# Patient Record
Sex: MALE | Race: WHITE | NOT HISPANIC OR LATINO
[De-identification: names, ages, dates, MRNs, and addresses within clinical notes are randomized per-mention and may not be internally consistent; named-entity substitution may affect disease eponyms.]

---

## 2017-01-13 ENCOUNTER — RECORDS - HEALTHEAST (OUTPATIENT)
Dept: ADMINISTRATIVE | Facility: OTHER | Age: 82
End: 2017-01-13

## 2017-03-24 ENCOUNTER — OFFICE VISIT - HEALTHEAST (OUTPATIENT)
Dept: INTERNAL MEDICINE | Facility: CLINIC | Age: 82
End: 2017-03-24

## 2017-03-24 ENCOUNTER — COMMUNICATION - HEALTHEAST (OUTPATIENT)
Dept: INTERNAL MEDICINE | Facility: CLINIC | Age: 82
End: 2017-03-24

## 2017-03-24 DIAGNOSIS — I10 ESSENTIAL HYPERTENSION: ICD-10-CM

## 2017-03-24 DIAGNOSIS — I25.9 IHD (ISCHEMIC HEART DISEASE): ICD-10-CM

## 2017-03-24 DIAGNOSIS — N42.9 DISORDER OF PROSTATE: ICD-10-CM

## 2017-03-24 ASSESSMENT — MIFFLIN-ST. JEOR: SCORE: 1272.78

## 2017-04-11 ENCOUNTER — RECORDS - HEALTHEAST (OUTPATIENT)
Dept: ADMINISTRATIVE | Facility: OTHER | Age: 82
End: 2017-04-11

## 2017-06-30 ENCOUNTER — OFFICE VISIT - HEALTHEAST (OUTPATIENT)
Dept: INTERNAL MEDICINE | Facility: CLINIC | Age: 82
End: 2017-06-30

## 2017-06-30 DIAGNOSIS — E78.5 HYPERLIPEMIA: ICD-10-CM

## 2017-06-30 DIAGNOSIS — D49.0 RECTAL TUMOR: ICD-10-CM

## 2017-06-30 DIAGNOSIS — I10 ESSENTIAL HYPERTENSION: ICD-10-CM

## 2017-06-30 DIAGNOSIS — I25.9 IHD (ISCHEMIC HEART DISEASE): ICD-10-CM

## 2017-06-30 LAB
CHOLEST SERPL-MCNC: 128 MG/DL
FASTING STATUS PATIENT QL REPORTED: YES
HDLC SERPL-MCNC: 45 MG/DL
LDLC SERPL CALC-MCNC: 67 MG/DL
TRIGL SERPL-MCNC: 79 MG/DL

## 2017-06-30 ASSESSMENT — MIFFLIN-ST. JEOR: SCORE: 1263.71

## 2017-07-11 ENCOUNTER — COMMUNICATION - HEALTHEAST (OUTPATIENT)
Dept: INTERNAL MEDICINE | Facility: CLINIC | Age: 82
End: 2017-07-11

## 2017-08-08 ENCOUNTER — RECORDS - HEALTHEAST (OUTPATIENT)
Dept: ADMINISTRATIVE | Facility: OTHER | Age: 82
End: 2017-08-08

## 2017-10-06 ENCOUNTER — OFFICE VISIT - HEALTHEAST (OUTPATIENT)
Dept: INTERNAL MEDICINE | Facility: CLINIC | Age: 82
End: 2017-10-06

## 2017-10-06 DIAGNOSIS — N42.9 DISORDER OF PROSTATE: ICD-10-CM

## 2017-10-06 DIAGNOSIS — I10 ESSENTIAL HYPERTENSION: ICD-10-CM

## 2017-10-06 DIAGNOSIS — E78.5 HYPERLIPEMIA: ICD-10-CM

## 2017-10-06 DIAGNOSIS — I25.9 IHD (ISCHEMIC HEART DISEASE): ICD-10-CM

## 2017-10-06 DIAGNOSIS — Z23 INFLUENZA VACCINE ADMINISTERED: ICD-10-CM

## 2017-10-06 LAB
CHOLEST SERPL-MCNC: 136 MG/DL
FASTING STATUS PATIENT QL REPORTED: YES
HDLC SERPL-MCNC: 47 MG/DL
LDLC SERPL CALC-MCNC: 72 MG/DL
TRIGL SERPL-MCNC: 86 MG/DL

## 2017-10-06 ASSESSMENT — MIFFLIN-ST. JEOR: SCORE: 1259.72

## 2018-01-12 ENCOUNTER — OFFICE VISIT - HEALTHEAST (OUTPATIENT)
Dept: INTERNAL MEDICINE | Facility: CLINIC | Age: 83
End: 2018-01-12

## 2018-01-12 ENCOUNTER — COMMUNICATION - HEALTHEAST (OUTPATIENT)
Dept: INTERNAL MEDICINE | Facility: CLINIC | Age: 83
End: 2018-01-12

## 2018-01-12 DIAGNOSIS — N42.31 PIN (PROSTATIC INTRAEPITHELIAL NEOPLASIA): ICD-10-CM

## 2018-01-12 DIAGNOSIS — E78.5 HYPERLIPEMIA: ICD-10-CM

## 2018-01-12 DIAGNOSIS — I10 ESSENTIAL HYPERTENSION: ICD-10-CM

## 2018-01-12 DIAGNOSIS — I25.9 IHD (ISCHEMIC HEART DISEASE): ICD-10-CM

## 2018-01-12 LAB
ALBUMIN SERPL-MCNC: 3.9 G/DL (ref 3.5–5)
ALP SERPL-CCNC: 70 U/L (ref 45–120)
ALT SERPL W P-5'-P-CCNC: 24 U/L (ref 0–45)
ANION GAP SERPL CALCULATED.3IONS-SCNC: 10 MMOL/L (ref 5–18)
AST SERPL W P-5'-P-CCNC: 25 U/L (ref 0–40)
BASOPHILS # BLD AUTO: 0.1 THOU/UL (ref 0–0.2)
BASOPHILS NFR BLD AUTO: 1 % (ref 0–2)
BILIRUB DIRECT SERPL-MCNC: 0.2 MG/DL
BILIRUB SERPL-MCNC: 0.8 MG/DL (ref 0–1)
BUN SERPL-MCNC: 26 MG/DL (ref 8–28)
CALCIUM SERPL-MCNC: 9.7 MG/DL (ref 8.5–10.5)
CHLORIDE BLD-SCNC: 105 MMOL/L (ref 98–107)
CHOLEST SERPL-MCNC: 138 MG/DL
CO2 SERPL-SCNC: 26 MMOL/L (ref 22–31)
CREAT SERPL-MCNC: 1.08 MG/DL (ref 0.7–1.3)
EOSINOPHIL # BLD AUTO: 0.3 THOU/UL (ref 0–0.4)
EOSINOPHIL NFR BLD AUTO: 4 % (ref 0–6)
ERYTHROCYTE [DISTWIDTH] IN BLOOD BY AUTOMATED COUNT: 12.1 % (ref 11–14.5)
FASTING STATUS PATIENT QL REPORTED: YES
GFR SERPL CREATININE-BSD FRML MDRD: >60 ML/MIN/1.73M2
GLUCOSE BLD-MCNC: 103 MG/DL (ref 70–125)
HCT VFR BLD AUTO: 42.2 % (ref 40–54)
HDLC SERPL-MCNC: 47 MG/DL
HGB BLD-MCNC: 14.1 G/DL (ref 14–18)
LDLC SERPL CALC-MCNC: 73 MG/DL
LYMPHOCYTES # BLD AUTO: 1.6 THOU/UL (ref 0.8–4.4)
LYMPHOCYTES NFR BLD AUTO: 24 % (ref 20–40)
MCH RBC QN AUTO: 30.6 PG (ref 27–34)
MCHC RBC AUTO-ENTMCNC: 33.3 G/DL (ref 32–36)
MCV RBC AUTO: 92 FL (ref 80–100)
MONOCYTES # BLD AUTO: 0.8 THOU/UL (ref 0–0.9)
MONOCYTES NFR BLD AUTO: 11 % (ref 2–10)
NEUTROPHILS # BLD AUTO: 3.9 THOU/UL (ref 2–7.7)
NEUTROPHILS NFR BLD AUTO: 60 % (ref 50–70)
PLATELET # BLD AUTO: 137 THOU/UL (ref 140–440)
PMV BLD AUTO: 9 FL (ref 7–10)
POTASSIUM BLD-SCNC: 4 MMOL/L (ref 3.5–5)
PROT SERPL-MCNC: 7.4 G/DL (ref 6–8)
RBC # BLD AUTO: 4.6 MILL/UL (ref 4.4–6.2)
SODIUM SERPL-SCNC: 141 MMOL/L (ref 136–145)
TRIGL SERPL-MCNC: 88 MG/DL
WBC: 6.6 THOU/UL (ref 4–11)

## 2018-01-12 ASSESSMENT — MIFFLIN-ST. JEOR: SCORE: 1277.5

## 2018-02-06 ENCOUNTER — RECORDS - HEALTHEAST (OUTPATIENT)
Dept: ADMINISTRATIVE | Facility: OTHER | Age: 83
End: 2018-02-06

## 2018-04-13 ENCOUNTER — COMMUNICATION - HEALTHEAST (OUTPATIENT)
Dept: INTERNAL MEDICINE | Facility: CLINIC | Age: 83
End: 2018-04-13

## 2018-04-13 ENCOUNTER — OFFICE VISIT - HEALTHEAST (OUTPATIENT)
Dept: INTERNAL MEDICINE | Facility: CLINIC | Age: 83
End: 2018-04-13

## 2018-04-13 DIAGNOSIS — I10 ESSENTIAL HYPERTENSION: ICD-10-CM

## 2018-04-13 DIAGNOSIS — D69.6 THROMBOCYTOPENIA (H): ICD-10-CM

## 2018-04-13 DIAGNOSIS — I25.9 IHD (ISCHEMIC HEART DISEASE): ICD-10-CM

## 2018-04-13 DIAGNOSIS — N42.31 PIN (PROSTATIC INTRAEPITHELIAL NEOPLASIA): ICD-10-CM

## 2018-04-13 DIAGNOSIS — E78.5 HYPERLIPEMIA: ICD-10-CM

## 2018-04-13 LAB
ALBUMIN SERPL-MCNC: 3.9 G/DL (ref 3.5–5)
ALP SERPL-CCNC: 74 U/L (ref 45–120)
ALT SERPL W P-5'-P-CCNC: 30 U/L (ref 0–45)
ANION GAP SERPL CALCULATED.3IONS-SCNC: 11 MMOL/L (ref 5–18)
AST SERPL W P-5'-P-CCNC: 24 U/L (ref 0–40)
BASOPHILS # BLD AUTO: 0.1 THOU/UL (ref 0–0.2)
BASOPHILS NFR BLD AUTO: 1 % (ref 0–2)
BILIRUB DIRECT SERPL-MCNC: 0.3 MG/DL
BILIRUB SERPL-MCNC: 0.8 MG/DL (ref 0–1)
BUN SERPL-MCNC: 34 MG/DL (ref 8–28)
CALCIUM SERPL-MCNC: 9.9 MG/DL (ref 8.5–10.5)
CHLORIDE BLD-SCNC: 104 MMOL/L (ref 98–107)
CHOLEST SERPL-MCNC: 131 MG/DL
CO2 SERPL-SCNC: 23 MMOL/L (ref 22–31)
CREAT SERPL-MCNC: 1.35 MG/DL (ref 0.7–1.3)
EOSINOPHIL # BLD AUTO: 0.2 THOU/UL (ref 0–0.4)
EOSINOPHIL NFR BLD AUTO: 3 % (ref 0–6)
ERYTHROCYTE [DISTWIDTH] IN BLOOD BY AUTOMATED COUNT: 12.5 % (ref 11–14.5)
FASTING STATUS PATIENT QL REPORTED: YES
GFR SERPL CREATININE-BSD FRML MDRD: 50 ML/MIN/1.73M2
GLUCOSE BLD-MCNC: 101 MG/DL (ref 70–125)
HCT VFR BLD AUTO: 44.7 % (ref 40–54)
HDLC SERPL-MCNC: 45 MG/DL
HGB BLD-MCNC: 14.5 G/DL (ref 14–18)
LDLC SERPL CALC-MCNC: 72 MG/DL
LYMPHOCYTES # BLD AUTO: 1.7 THOU/UL (ref 0.8–4.4)
LYMPHOCYTES NFR BLD AUTO: 22 % (ref 20–40)
MCH RBC QN AUTO: 30.8 PG (ref 27–34)
MCHC RBC AUTO-ENTMCNC: 32.4 G/DL (ref 32–36)
MCV RBC AUTO: 95 FL (ref 80–100)
MONOCYTES # BLD AUTO: 0.9 THOU/UL (ref 0–0.9)
MONOCYTES NFR BLD AUTO: 13 % (ref 2–10)
NEUTROPHILS # BLD AUTO: 4.5 THOU/UL (ref 2–7.7)
NEUTROPHILS NFR BLD AUTO: 62 % (ref 50–70)
PLATELET # BLD AUTO: 154 THOU/UL (ref 140–440)
PMV BLD AUTO: 12.3 FL (ref 8.5–12.5)
POTASSIUM BLD-SCNC: 4.2 MMOL/L (ref 3.5–5)
PROT SERPL-MCNC: 7.5 G/DL (ref 6–8)
RBC # BLD AUTO: 4.71 MILL/UL (ref 4.4–6.2)
SODIUM SERPL-SCNC: 138 MMOL/L (ref 136–145)
TRIGL SERPL-MCNC: 69 MG/DL
WBC: 7.4 THOU/UL (ref 4–11)

## 2018-04-13 ASSESSMENT — MIFFLIN-ST. JEOR: SCORE: 1272.96

## 2018-07-20 ENCOUNTER — OFFICE VISIT - HEALTHEAST (OUTPATIENT)
Dept: INTERNAL MEDICINE | Facility: CLINIC | Age: 83
End: 2018-07-20

## 2018-07-20 DIAGNOSIS — D69.6 THROMBOCYTOPENIA (H): ICD-10-CM

## 2018-07-20 DIAGNOSIS — E78.5 HYPERLIPEMIA: ICD-10-CM

## 2018-07-20 DIAGNOSIS — I10 ESSENTIAL HYPERTENSION: ICD-10-CM

## 2018-07-20 DIAGNOSIS — I25.9 IHD (ISCHEMIC HEART DISEASE): ICD-10-CM

## 2018-07-20 LAB
ALBUMIN SERPL-MCNC: 3.9 G/DL (ref 3.5–5)
ALP SERPL-CCNC: 59 U/L (ref 45–120)
ALT SERPL W P-5'-P-CCNC: 22 U/L (ref 0–45)
ANION GAP SERPL CALCULATED.3IONS-SCNC: 10 MMOL/L (ref 5–18)
AST SERPL W P-5'-P-CCNC: 23 U/L (ref 0–40)
BASOPHILS # BLD AUTO: 0 THOU/UL (ref 0–0.2)
BASOPHILS NFR BLD AUTO: 1 % (ref 0–2)
BILIRUB DIRECT SERPL-MCNC: 0.2 MG/DL
BILIRUB SERPL-MCNC: 0.7 MG/DL (ref 0–1)
BUN SERPL-MCNC: 24 MG/DL (ref 8–28)
CALCIUM SERPL-MCNC: 9.7 MG/DL (ref 8.5–10.5)
CHLORIDE BLD-SCNC: 108 MMOL/L (ref 98–107)
CHOLEST SERPL-MCNC: 123 MG/DL
CO2 SERPL-SCNC: 23 MMOL/L (ref 22–31)
CREAT SERPL-MCNC: 1.12 MG/DL (ref 0.7–1.3)
EOSINOPHIL # BLD AUTO: 0.2 THOU/UL (ref 0–0.4)
EOSINOPHIL NFR BLD AUTO: 3 % (ref 0–6)
ERYTHROCYTE [DISTWIDTH] IN BLOOD BY AUTOMATED COUNT: 13.2 % (ref 11–14.5)
FASTING STATUS PATIENT QL REPORTED: YES
GFR SERPL CREATININE-BSD FRML MDRD: >60 ML/MIN/1.73M2
GLUCOSE BLD-MCNC: 105 MG/DL (ref 70–125)
HCT VFR BLD AUTO: 43 % (ref 40–54)
HDLC SERPL-MCNC: 46 MG/DL
HGB BLD-MCNC: 14 G/DL (ref 14–18)
LDLC SERPL CALC-MCNC: 63 MG/DL
LYMPHOCYTES # BLD AUTO: 1.4 THOU/UL (ref 0.8–4.4)
LYMPHOCYTES NFR BLD AUTO: 20 % (ref 20–40)
MCH RBC QN AUTO: 30.4 PG (ref 27–34)
MCHC RBC AUTO-ENTMCNC: 32.6 G/DL (ref 32–36)
MCV RBC AUTO: 94 FL (ref 80–100)
MONOCYTES # BLD AUTO: 0.8 THOU/UL (ref 0–0.9)
MONOCYTES NFR BLD AUTO: 12 % (ref 2–10)
NEUTROPHILS # BLD AUTO: 4.4 THOU/UL (ref 2–7.7)
NEUTROPHILS NFR BLD AUTO: 65 % (ref 50–70)
PLATELET # BLD AUTO: 146 THOU/UL (ref 140–440)
PMV BLD AUTO: 12.5 FL (ref 8.5–12.5)
POTASSIUM BLD-SCNC: 4.2 MMOL/L (ref 3.5–5)
PROT SERPL-MCNC: 6.8 G/DL (ref 6–8)
RBC # BLD AUTO: 4.6 MILL/UL (ref 4.4–6.2)
SODIUM SERPL-SCNC: 141 MMOL/L (ref 136–145)
TRIGL SERPL-MCNC: 69 MG/DL
WBC: 6.8 THOU/UL (ref 4–11)

## 2018-07-20 ASSESSMENT — MIFFLIN-ST. JEOR: SCORE: 1255.18

## 2018-10-26 ENCOUNTER — RECORDS - HEALTHEAST (OUTPATIENT)
Dept: GENERAL RADIOLOGY | Facility: CLINIC | Age: 83
End: 2018-10-26

## 2018-10-26 ENCOUNTER — COMMUNICATION - HEALTHEAST (OUTPATIENT)
Dept: INTERNAL MEDICINE | Facility: CLINIC | Age: 83
End: 2018-10-26

## 2018-10-26 ENCOUNTER — OFFICE VISIT - HEALTHEAST (OUTPATIENT)
Dept: INTERNAL MEDICINE | Facility: CLINIC | Age: 83
End: 2018-10-26

## 2018-10-26 DIAGNOSIS — I10 ESSENTIAL HYPERTENSION: ICD-10-CM

## 2018-10-26 DIAGNOSIS — D49.0 RECTAL TUMOR: ICD-10-CM

## 2018-10-26 DIAGNOSIS — Z23 FLU VACCINE NEED: ICD-10-CM

## 2018-10-26 DIAGNOSIS — R91.8 ABNORMALITY OF LUNG ON CXR: ICD-10-CM

## 2018-10-26 DIAGNOSIS — R06.00 DYSPNEA: ICD-10-CM

## 2018-10-26 DIAGNOSIS — N42.31 PIN (PROSTATIC INTRAEPITHELIAL NEOPLASIA): ICD-10-CM

## 2018-10-26 DIAGNOSIS — E78.5 HYPERLIPEMIA: ICD-10-CM

## 2018-10-26 DIAGNOSIS — R06.00 DYSPNEA, UNSPECIFIED: ICD-10-CM

## 2018-10-26 DIAGNOSIS — I25.9 IHD (ISCHEMIC HEART DISEASE): ICD-10-CM

## 2018-10-26 LAB
ALBUMIN SERPL-MCNC: 3.9 G/DL (ref 3.5–5)
ALP SERPL-CCNC: 75 U/L (ref 45–120)
ALT SERPL W P-5'-P-CCNC: 22 U/L (ref 0–45)
ANION GAP SERPL CALCULATED.3IONS-SCNC: 13 MMOL/L (ref 5–18)
AST SERPL W P-5'-P-CCNC: 22 U/L (ref 0–40)
ATRIAL RATE - MUSE: 82 BPM
BASOPHILS # BLD AUTO: 0.1 THOU/UL (ref 0–0.2)
BASOPHILS NFR BLD AUTO: 1 % (ref 0–2)
BILIRUB DIRECT SERPL-MCNC: 0.3 MG/DL
BILIRUB SERPL-MCNC: 0.7 MG/DL (ref 0–1)
BNP SERPL-MCNC: 29 PG/ML (ref 0–93)
BUN SERPL-MCNC: 31 MG/DL (ref 8–28)
CALCIUM SERPL-MCNC: 10.1 MG/DL (ref 8.5–10.5)
CHLORIDE BLD-SCNC: 106 MMOL/L (ref 98–107)
CHOLEST SERPL-MCNC: 128 MG/DL
CO2 SERPL-SCNC: 21 MMOL/L (ref 22–31)
CREAT SERPL-MCNC: 1.24 MG/DL (ref 0.7–1.3)
DIASTOLIC BLOOD PRESSURE - MUSE: NORMAL MMHG
EOSINOPHIL # BLD AUTO: 0.2 THOU/UL (ref 0–0.4)
EOSINOPHIL NFR BLD AUTO: 3 % (ref 0–6)
ERYTHROCYTE [DISTWIDTH] IN BLOOD BY AUTOMATED COUNT: 13 % (ref 11–14.5)
ERYTHROCYTE [SEDIMENTATION RATE] IN BLOOD BY WESTERGREN METHOD: 23 MM/HR (ref 0–15)
FASTING STATUS PATIENT QL REPORTED: YES
GFR SERPL CREATININE-BSD FRML MDRD: 55 ML/MIN/1.73M2
GLUCOSE BLD-MCNC: 105 MG/DL (ref 70–125)
HCT VFR BLD AUTO: 43.4 % (ref 40–54)
HDLC SERPL-MCNC: 51 MG/DL
HGB BLD-MCNC: 14.3 G/DL (ref 14–18)
INTERPRETATION ECG - MUSE: NORMAL
LDLC SERPL CALC-MCNC: 65 MG/DL
LYMPHOCYTES # BLD AUTO: 1.1 THOU/UL (ref 0.8–4.4)
LYMPHOCYTES NFR BLD AUTO: 16 % (ref 20–40)
MCH RBC QN AUTO: 30.6 PG (ref 27–34)
MCHC RBC AUTO-ENTMCNC: 32.9 G/DL (ref 32–36)
MCV RBC AUTO: 93 FL (ref 80–100)
MONOCYTES # BLD AUTO: 0.8 THOU/UL (ref 0–0.9)
MONOCYTES NFR BLD AUTO: 12 % (ref 2–10)
NEUTROPHILS # BLD AUTO: 4.6 THOU/UL (ref 2–7.7)
NEUTROPHILS NFR BLD AUTO: 69 % (ref 50–70)
P AXIS - MUSE: 69 DEGREES
PLATELET # BLD AUTO: 163 THOU/UL (ref 140–440)
PMV BLD AUTO: 12.1 FL (ref 8.5–12.5)
POTASSIUM BLD-SCNC: 4.5 MMOL/L (ref 3.5–5)
PR INTERVAL - MUSE: 176 MS
PROT SERPL-MCNC: 7.2 G/DL (ref 6–8)
QRS DURATION - MUSE: 102 MS
QT - MUSE: 388 MS
QTC - MUSE: 453 MS
R AXIS - MUSE: 74 DEGREES
RBC # BLD AUTO: 4.68 MILL/UL (ref 4.4–6.2)
SODIUM SERPL-SCNC: 140 MMOL/L (ref 136–145)
SYSTOLIC BLOOD PRESSURE - MUSE: NORMAL MMHG
T AXIS - MUSE: 53 DEGREES
TRIGL SERPL-MCNC: 59 MG/DL
TSH SERPL DL<=0.005 MIU/L-ACNC: 2.5 UIU/ML (ref 0.3–5)
VENTRICULAR RATE- MUSE: 82 BPM
WBC: 6.7 THOU/UL (ref 4–11)

## 2018-10-26 ASSESSMENT — MIFFLIN-ST. JEOR: SCORE: 1213.82

## 2018-10-31 ENCOUNTER — RECORDS - HEALTHEAST (OUTPATIENT)
Dept: ADMINISTRATIVE | Facility: OTHER | Age: 83
End: 2018-10-31

## 2018-11-05 ENCOUNTER — HOSPITAL ENCOUNTER (OUTPATIENT)
Dept: CARDIOLOGY | Facility: HOSPITAL | Age: 83
Discharge: HOME | End: 2018-11-05
Attending: INTERNAL MEDICINE

## 2018-11-05 ENCOUNTER — HOSPITAL ENCOUNTER (OUTPATIENT)
Dept: CT IMAGING | Facility: HOSPITAL | Age: 83
Discharge: HOME | End: 2018-11-05
Attending: INTERNAL MEDICINE

## 2018-11-05 DIAGNOSIS — R06.00 DYSPNEA: ICD-10-CM

## 2018-11-05 DIAGNOSIS — R91.8 ABNORMALITY OF LUNG ON CXR: ICD-10-CM

## 2018-11-05 DIAGNOSIS — I25.9 IHD (ISCHEMIC HEART DISEASE): ICD-10-CM

## 2018-11-05 LAB
CREAT BLD-MCNC: 1.3 MG/DL
POC GFR AMER AF HE - HISTORICAL: >60 ML/MIN/1.73M2
POC GFR NON AMER AF HE - HISTORICAL: 51 ML/MIN/1.73M2

## 2018-11-05 ASSESSMENT — MIFFLIN-ST. JEOR: SCORE: 1213.82

## 2018-11-06 LAB
AORTIC ROOT: 3.1 CM
AORTIC VALVE MEAN VELOCITY: 196 CM/S
AR DECEL SLOPE: 2140 MM/S2
AR PEAK VELOCITY: 329 CM/S
AV DIMENSIONLESS INDEX VTI: 0.4
AV MEAN GRADIENT: 18 MMHG
AV PEAK GRADIENT: 35.3 MMHG
AV REGURGITANT PEAK GRADIENT: 43.3 MMHG
AV REGURGITATION PRESSURE HALF TIME: 458 MS
AV VALVE AREA: 1.1 CM2
AV VELOCITY RATIO: 0.4
BSA FOR ECHO PROCEDURE: 1.71 M2
CV BLOOD PRESSURE: NORMAL MMHG
CV ECHO HEIGHT: 65.8 IN
CV ECHO WEIGHT: 140 LBS
DOP CALC AO PEAK VEL: 297 CM/S
DOP CALC AO VTI: 52.4 CM
DOP CALC LVOT AREA: 2.54 CM2
DOP CALC LVOT DIAMETER: 1.8 CM
DOP CALC LVOT PEAK VEL: 112 CM/S
DOP CALC LVOT STROKE VOLUME: 58.2 CM3
DOP CALCLVOT PEAK VEL VTI: 22.9 CM
ECHO EJECTION FRACTION ESTIMATED: 60 %
EJECTION FRACTION: 53 % (ref 55–75)
FRACTIONAL SHORTENING: 19.6 % (ref 28–44)
INTERVENTRICULAR SEPTUM IN END DIASTOLE: 1.51 CM (ref 0.6–1)
IVS/PW RATIO: 1.2
LA AREA 1: 14 CM2
LA AREA 2: 16 CM2
LEFT ATRIUM LENGTH: 3.9 CM
LEFT ATRIUM SIZE: 3.5 CM
LEFT ATRIUM TO AORTIC ROOT RATIO: 1.13 NO UNITS
LEFT ATRIUM VOLUME INDEX: 28.6 ML/M2
LEFT ATRIUM VOLUME: 48.8 ML
LEFT VENTRICLE CARDIAC INDEX: 2.5 L/MIN/M2
LEFT VENTRICLE CARDIAC OUTPUT: 4.3 L/MIN
LEFT VENTRICLE DIASTOLIC VOLUME INDEX: 60.2 CM3/M2 (ref 34–74)
LEFT VENTRICLE DIASTOLIC VOLUME: 103 CM3 (ref 62–150)
LEFT VENTRICLE HEART RATE: 73 BPM
LEFT VENTRICLE MASS INDEX: 101.4 G/M2
LEFT VENTRICLE SYSTOLIC VOLUME INDEX: 28.1 CM3/M2 (ref 11–31)
LEFT VENTRICLE SYSTOLIC VOLUME: 48.1 CM3 (ref 21–61)
LEFT VENTRICULAR INTERNAL DIMENSION IN DIASTOLE: 3.52 CM (ref 4.2–5.8)
LEFT VENTRICULAR INTERNAL DIMENSION IN SYSTOLE: 2.83 CM (ref 2.5–4)
LEFT VENTRICULAR MASS: 173.3 G
LEFT VENTRICULAR OUTFLOW TRACT MEAN GRADIENT: 3 MMHG
LEFT VENTRICULAR OUTFLOW TRACT MEAN VELOCITY: 75.1 CM/S
LEFT VENTRICULAR OUTFLOW TRACT PEAK GRADIENT: 5 MMHG
LEFT VENTRICULAR POSTERIOR WALL IN END DIASTOLE: 1.28 CM (ref 0.6–1)
LV STROKE VOLUME INDEX: 34.1 ML/M2
NUC REST DIASTOLIC VOLUME INDEX: 2240 LBS
NUC REST SYSTOLIC VOLUME INDEX: 65.75 IN
PR MAX PG: 5 MMHG
PR PEAK VELOCITY: 107 CM/S
TRICUSPID REGURGITATION PEAK PRESSURE GRADIENT: 29.8 MMHG
TRICUSPID VALVE ANULAR PLANE SYSTOLIC EXCURSION: 2.9 CM
TRICUSPID VALVE PEAK REGURGITANT VELOCITY: 273 CM/S

## 2018-11-07 ENCOUNTER — COMMUNICATION - HEALTHEAST (OUTPATIENT)
Dept: INTERNAL MEDICINE | Facility: CLINIC | Age: 83
End: 2018-11-07

## 2018-11-08 ENCOUNTER — COMMUNICATION - HEALTHEAST (OUTPATIENT)
Dept: INTERNAL MEDICINE | Facility: CLINIC | Age: 83
End: 2018-11-08

## 2018-12-07 ENCOUNTER — OFFICE VISIT - HEALTHEAST (OUTPATIENT)
Dept: INTERNAL MEDICINE | Facility: CLINIC | Age: 83
End: 2018-12-07

## 2018-12-07 ENCOUNTER — COMMUNICATION - HEALTHEAST (OUTPATIENT)
Dept: INTERNAL MEDICINE | Facility: CLINIC | Age: 83
End: 2018-12-07

## 2018-12-07 DIAGNOSIS — D12.8 ADENOMATOUS RECTAL POLYP: ICD-10-CM

## 2018-12-07 DIAGNOSIS — E78.5 HYPERLIPIDEMIA, UNSPECIFIED HYPERLIPIDEMIA TYPE: ICD-10-CM

## 2018-12-07 DIAGNOSIS — I10 ESSENTIAL HYPERTENSION: ICD-10-CM

## 2018-12-07 DIAGNOSIS — I25.9 IHD (ISCHEMIC HEART DISEASE): ICD-10-CM

## 2018-12-07 DIAGNOSIS — D69.6 THROMBOCYTOPENIA (H): ICD-10-CM

## 2018-12-07 DIAGNOSIS — I35.0 MODERATE AORTIC STENOSIS BY PRIOR ECHOCARDIOGRAPHY: ICD-10-CM

## 2018-12-07 LAB
ALBUMIN SERPL-MCNC: 3.9 G/DL (ref 3.5–5)
ANION GAP SERPL CALCULATED.3IONS-SCNC: 10 MMOL/L (ref 5–18)
BASOPHILS # BLD AUTO: 0 THOU/UL (ref 0–0.2)
BASOPHILS NFR BLD AUTO: 1 % (ref 0–2)
BUN SERPL-MCNC: 30 MG/DL (ref 8–28)
CALCIUM SERPL-MCNC: 10 MG/DL (ref 8.5–10.5)
CHLORIDE BLD-SCNC: 106 MMOL/L (ref 98–107)
CHOLEST SERPL-MCNC: 124 MG/DL
CO2 SERPL-SCNC: 25 MMOL/L (ref 22–31)
CREAT SERPL-MCNC: 1.01 MG/DL (ref 0.7–1.3)
EOSINOPHIL # BLD AUTO: 0.2 THOU/UL (ref 0–0.4)
EOSINOPHIL NFR BLD AUTO: 3 % (ref 0–6)
ERYTHROCYTE [DISTWIDTH] IN BLOOD BY AUTOMATED COUNT: 13 % (ref 11–14.5)
FASTING STATUS PATIENT QL REPORTED: YES
GFR SERPL CREATININE-BSD FRML MDRD: >60 ML/MIN/1.73M2
GLUCOSE BLD-MCNC: 105 MG/DL (ref 70–125)
HCT VFR BLD AUTO: 41.3 % (ref 40–54)
HDLC SERPL-MCNC: 50 MG/DL
HGB BLD-MCNC: 13.9 G/DL (ref 14–18)
LDLC SERPL CALC-MCNC: 59 MG/DL
LYMPHOCYTES # BLD AUTO: 1.5 THOU/UL (ref 0.8–4.4)
LYMPHOCYTES NFR BLD AUTO: 24 % (ref 20–40)
MCH RBC QN AUTO: 31.1 PG (ref 27–34)
MCHC RBC AUTO-ENTMCNC: 33.7 G/DL (ref 32–36)
MCV RBC AUTO: 92 FL (ref 80–100)
MONOCYTES # BLD AUTO: 0.6 THOU/UL (ref 0–0.9)
MONOCYTES NFR BLD AUTO: 10 % (ref 2–10)
NEUTROPHILS # BLD AUTO: 3.7 THOU/UL (ref 2–7.7)
NEUTROPHILS NFR BLD AUTO: 62 % (ref 50–70)
PHOSPHATE SERPL-MCNC: 3.8 MG/DL (ref 2.5–4.5)
PLATELET # BLD AUTO: 138 THOU/UL (ref 140–440)
PMV BLD AUTO: 8.8 FL (ref 7–10)
POTASSIUM BLD-SCNC: 4.1 MMOL/L (ref 3.5–5)
RBC # BLD AUTO: 4.49 MILL/UL (ref 4.4–6.2)
SODIUM SERPL-SCNC: 141 MMOL/L (ref 136–145)
TRIGL SERPL-MCNC: 76 MG/DL
WBC: 6 THOU/UL (ref 4–11)

## 2018-12-07 ASSESSMENT — MIFFLIN-ST. JEOR: SCORE: 1223.43

## 2018-12-12 ASSESSMENT — MIFFLIN-ST. JEOR: SCORE: 1222.32

## 2018-12-13 ENCOUNTER — ANESTHESIA - HEALTHEAST (OUTPATIENT)
Dept: SURGERY | Facility: AMBULATORY SURGERY CENTER | Age: 83
End: 2018-12-13

## 2019-01-03 ENCOUNTER — SURGERY - HEALTHEAST (OUTPATIENT)
Dept: SURGERY | Facility: AMBULATORY SURGERY CENTER | Age: 84
End: 2019-01-03

## 2019-01-03 ASSESSMENT — MIFFLIN-ST. JEOR: SCORE: 1222.32

## 2019-01-29 ENCOUNTER — RECORDS - HEALTHEAST (OUTPATIENT)
Dept: ADMINISTRATIVE | Facility: OTHER | Age: 84
End: 2019-01-29

## 2019-02-21 ENCOUNTER — COMMUNICATION - HEALTHEAST (OUTPATIENT)
Dept: INTERNAL MEDICINE | Facility: CLINIC | Age: 84
End: 2019-02-21

## 2019-02-21 DIAGNOSIS — N42.31 PIN (PROSTATIC INTRAEPITHELIAL NEOPLASIA): ICD-10-CM

## 2019-02-21 DIAGNOSIS — I25.9 IHD (ISCHEMIC HEART DISEASE): ICD-10-CM

## 2019-04-05 ENCOUNTER — OFFICE VISIT - HEALTHEAST (OUTPATIENT)
Dept: INTERNAL MEDICINE | Facility: CLINIC | Age: 84
End: 2019-04-05

## 2019-04-05 DIAGNOSIS — E78.5 HYPERLIPIDEMIA, UNSPECIFIED HYPERLIPIDEMIA TYPE: ICD-10-CM

## 2019-04-05 DIAGNOSIS — I35.0 MODERATE AORTIC STENOSIS BY PRIOR ECHOCARDIOGRAPHY: ICD-10-CM

## 2019-04-05 DIAGNOSIS — D12.8 ADENOMATOUS RECTAL POLYP: ICD-10-CM

## 2019-04-05 DIAGNOSIS — I25.9 IHD (ISCHEMIC HEART DISEASE): ICD-10-CM

## 2019-04-05 DIAGNOSIS — I10 ESSENTIAL HYPERTENSION: ICD-10-CM

## 2019-04-05 LAB
ALBUMIN SERPL-MCNC: 4 G/DL (ref 3.5–5)
ANION GAP SERPL CALCULATED.3IONS-SCNC: 10 MMOL/L (ref 5–18)
BUN SERPL-MCNC: 29 MG/DL (ref 8–28)
CALCIUM SERPL-MCNC: 10.1 MG/DL (ref 8.5–10.5)
CHLORIDE BLD-SCNC: 107 MMOL/L (ref 98–107)
CO2 SERPL-SCNC: 24 MMOL/L (ref 22–31)
CREAT SERPL-MCNC: 1.13 MG/DL (ref 0.7–1.3)
GFR SERPL CREATININE-BSD FRML MDRD: >60 ML/MIN/1.73M2
GLUCOSE BLD-MCNC: 99 MG/DL (ref 70–125)
PHOSPHATE SERPL-MCNC: 3.9 MG/DL (ref 2.5–4.5)
POTASSIUM BLD-SCNC: 4.4 MMOL/L (ref 3.5–5)
SODIUM SERPL-SCNC: 141 MMOL/L (ref 136–145)

## 2019-04-05 ASSESSMENT — MIFFLIN-ST. JEOR: SCORE: 1217.97

## 2019-04-06 ENCOUNTER — COMMUNICATION - HEALTHEAST (OUTPATIENT)
Dept: INTERNAL MEDICINE | Facility: CLINIC | Age: 84
End: 2019-04-06

## 2019-07-12 ENCOUNTER — COMMUNICATION - HEALTHEAST (OUTPATIENT)
Dept: INTERNAL MEDICINE | Facility: CLINIC | Age: 84
End: 2019-07-12

## 2019-07-12 ENCOUNTER — OFFICE VISIT - HEALTHEAST (OUTPATIENT)
Dept: INTERNAL MEDICINE | Facility: CLINIC | Age: 84
End: 2019-07-12

## 2019-07-12 DIAGNOSIS — D69.6 THROMBOCYTOPENIA (H): ICD-10-CM

## 2019-07-12 DIAGNOSIS — I35.0 MODERATE AORTIC STENOSIS BY PRIOR ECHOCARDIOGRAPHY: ICD-10-CM

## 2019-07-12 DIAGNOSIS — I25.9 IHD (ISCHEMIC HEART DISEASE): ICD-10-CM

## 2019-07-12 DIAGNOSIS — I10 ESSENTIAL HYPERTENSION: ICD-10-CM

## 2019-07-12 DIAGNOSIS — N42.31 PIN (PROSTATIC INTRAEPITHELIAL NEOPLASIA): ICD-10-CM

## 2019-07-12 DIAGNOSIS — D12.8 ADENOMATOUS RECTAL POLYP: ICD-10-CM

## 2019-07-12 DIAGNOSIS — R09.89 BILATERAL CAROTID BRUITS: ICD-10-CM

## 2019-07-12 DIAGNOSIS — E78.5 HYPERLIPIDEMIA, UNSPECIFIED HYPERLIPIDEMIA TYPE: ICD-10-CM

## 2019-07-12 LAB
ALBUMIN SERPL-MCNC: 3.9 G/DL (ref 3.5–5)
ALP SERPL-CCNC: 64 U/L (ref 45–120)
ALT SERPL W P-5'-P-CCNC: 20 U/L (ref 0–45)
ANION GAP SERPL CALCULATED.3IONS-SCNC: 8 MMOL/L (ref 5–18)
AST SERPL W P-5'-P-CCNC: 20 U/L (ref 0–40)
BASOPHILS # BLD AUTO: 0 THOU/UL (ref 0–0.2)
BASOPHILS NFR BLD AUTO: 1 % (ref 0–2)
BILIRUB DIRECT SERPL-MCNC: 0.3 MG/DL
BILIRUB SERPL-MCNC: 0.7 MG/DL (ref 0–1)
BUN SERPL-MCNC: 29 MG/DL (ref 8–28)
CALCIUM SERPL-MCNC: 10 MG/DL (ref 8.5–10.5)
CHLORIDE BLD-SCNC: 107 MMOL/L (ref 98–107)
CHOLEST SERPL-MCNC: 132 MG/DL
CO2 SERPL-SCNC: 26 MMOL/L (ref 22–31)
CREAT SERPL-MCNC: 1.18 MG/DL (ref 0.7–1.3)
EOSINOPHIL # BLD AUTO: 0.2 THOU/UL (ref 0–0.4)
EOSINOPHIL NFR BLD AUTO: 2 % (ref 0–6)
ERYTHROCYTE [DISTWIDTH] IN BLOOD BY AUTOMATED COUNT: 12.9 % (ref 11–14.5)
FASTING STATUS PATIENT QL REPORTED: YES
GFR SERPL CREATININE-BSD FRML MDRD: 58 ML/MIN/1.73M2
GLUCOSE BLD-MCNC: 98 MG/DL (ref 70–125)
HCT VFR BLD AUTO: 41.2 % (ref 40–54)
HDLC SERPL-MCNC: 53 MG/DL
HGB BLD-MCNC: 13.7 G/DL (ref 14–18)
LDLC SERPL CALC-MCNC: 67 MG/DL
LYMPHOCYTES # BLD AUTO: 1.2 THOU/UL (ref 0.8–4.4)
LYMPHOCYTES NFR BLD AUTO: 17 % (ref 20–40)
MCH RBC QN AUTO: 30.8 PG (ref 27–34)
MCHC RBC AUTO-ENTMCNC: 33.3 G/DL (ref 32–36)
MCV RBC AUTO: 93 FL (ref 80–100)
MONOCYTES # BLD AUTO: 0.8 THOU/UL (ref 0–0.9)
MONOCYTES NFR BLD AUTO: 11 % (ref 2–10)
NEUTROPHILS # BLD AUTO: 4.8 THOU/UL (ref 2–7.7)
NEUTROPHILS NFR BLD AUTO: 69 % (ref 50–70)
PLATELET # BLD AUTO: 148 THOU/UL (ref 140–440)
PMV BLD AUTO: 12 FL (ref 8.5–12.5)
POTASSIUM BLD-SCNC: 4.4 MMOL/L (ref 3.5–5)
PROT SERPL-MCNC: 7.1 G/DL (ref 6–8)
RBC # BLD AUTO: 4.45 MILL/UL (ref 4.4–6.2)
SODIUM SERPL-SCNC: 141 MMOL/L (ref 136–145)
TRIGL SERPL-MCNC: 62 MG/DL
WBC: 7 THOU/UL (ref 4–11)

## 2019-07-12 ASSESSMENT — MIFFLIN-ST. JEOR: SCORE: 1222.87

## 2019-08-12 ENCOUNTER — HOSPITAL ENCOUNTER (OUTPATIENT)
Dept: ULTRASOUND IMAGING | Facility: HOSPITAL | Age: 84
Discharge: HOME | End: 2019-08-12
Attending: INTERNAL MEDICINE

## 2019-08-12 DIAGNOSIS — R09.89 BILATERAL CAROTID BRUITS: ICD-10-CM

## 2019-08-13 ENCOUNTER — COMMUNICATION - HEALTHEAST (OUTPATIENT)
Dept: INTERNAL MEDICINE | Facility: CLINIC | Age: 84
End: 2019-08-13

## 2019-10-18 ENCOUNTER — OFFICE VISIT - HEALTHEAST (OUTPATIENT)
Dept: INTERNAL MEDICINE | Facility: CLINIC | Age: 84
End: 2019-10-18

## 2019-10-18 DIAGNOSIS — I10 ESSENTIAL HYPERTENSION: ICD-10-CM

## 2019-10-18 DIAGNOSIS — D69.6 THROMBOCYTOPENIA (H): ICD-10-CM

## 2019-10-18 DIAGNOSIS — Z23 FLU VACCINE NEED: ICD-10-CM

## 2019-10-18 DIAGNOSIS — I25.9 IHD (ISCHEMIC HEART DISEASE): ICD-10-CM

## 2019-10-18 DIAGNOSIS — E78.5 HYPERLIPIDEMIA, UNSPECIFIED HYPERLIPIDEMIA TYPE: ICD-10-CM

## 2019-10-18 DIAGNOSIS — I35.0 MODERATE AORTIC STENOSIS BY PRIOR ECHOCARDIOGRAPHY: ICD-10-CM

## 2019-10-18 LAB
ALBUMIN SERPL-MCNC: 4.1 G/DL (ref 3.5–5)
ANION GAP SERPL CALCULATED.3IONS-SCNC: 9 MMOL/L (ref 5–18)
BUN SERPL-MCNC: 34 MG/DL (ref 8–28)
CALCIUM SERPL-MCNC: 9.7 MG/DL (ref 8.5–10.5)
CHLORIDE BLD-SCNC: 105 MMOL/L (ref 98–107)
CO2 SERPL-SCNC: 26 MMOL/L (ref 22–31)
CREAT SERPL-MCNC: 1.28 MG/DL (ref 0.7–1.3)
GFR SERPL CREATININE-BSD FRML MDRD: 53 ML/MIN/1.73M2
GLUCOSE BLD-MCNC: 98 MG/DL (ref 70–125)
PHOSPHATE SERPL-MCNC: 3.3 MG/DL (ref 2.5–4.5)
POTASSIUM BLD-SCNC: 4.4 MMOL/L (ref 3.5–5)
SODIUM SERPL-SCNC: 140 MMOL/L (ref 136–145)

## 2019-10-18 ASSESSMENT — MIFFLIN-ST. JEOR: SCORE: 1236.11

## 2019-10-23 ENCOUNTER — COMMUNICATION - HEALTHEAST (OUTPATIENT)
Dept: INTERNAL MEDICINE | Facility: CLINIC | Age: 84
End: 2019-10-23

## 2019-11-20 ENCOUNTER — RECORDS - HEALTHEAST (OUTPATIENT)
Dept: ADMINISTRATIVE | Facility: OTHER | Age: 84
End: 2019-11-20

## 2019-12-04 ENCOUNTER — COMMUNICATION - HEALTHEAST (OUTPATIENT)
Dept: INTERNAL MEDICINE | Facility: CLINIC | Age: 84
End: 2019-12-04

## 2019-12-13 ENCOUNTER — OFFICE VISIT - HEALTHEAST (OUTPATIENT)
Dept: INTERNAL MEDICINE | Facility: CLINIC | Age: 84
End: 2019-12-13

## 2019-12-13 DIAGNOSIS — I10 ESSENTIAL HYPERTENSION: ICD-10-CM

## 2019-12-13 DIAGNOSIS — I25.9 IHD (ISCHEMIC HEART DISEASE): ICD-10-CM

## 2019-12-13 DIAGNOSIS — Z01.818 PREOPERATIVE EXAMINATION: ICD-10-CM

## 2019-12-13 LAB
ATRIAL RATE - MUSE: 76 BPM
DIASTOLIC BLOOD PRESSURE - MUSE: NORMAL
INTERPRETATION ECG - MUSE: NORMAL
P AXIS - MUSE: 72 DEGREES
PR INTERVAL - MUSE: 186 MS
QRS DURATION - MUSE: 104 MS
QT - MUSE: 392 MS
QTC - MUSE: 441 MS
R AXIS - MUSE: 72 DEGREES
SYSTOLIC BLOOD PRESSURE - MUSE: NORMAL
T AXIS - MUSE: 27 DEGREES
VENTRICULAR RATE- MUSE: 76 BPM

## 2020-01-03 ASSESSMENT — MIFFLIN-ST. JEOR
SCORE: 1240.47
SCORE: 1240.47

## 2020-01-09 ENCOUNTER — ANESTHESIA - HEALTHEAST (OUTPATIENT)
Dept: SURGERY | Facility: AMBULATORY SURGERY CENTER | Age: 85
End: 2020-01-09

## 2020-01-10 ENCOUNTER — SURGERY - HEALTHEAST (OUTPATIENT)
Dept: SURGERY | Facility: AMBULATORY SURGERY CENTER | Age: 85
End: 2020-01-10

## 2020-01-10 ENCOUNTER — HOSPITAL ENCOUNTER (OUTPATIENT)
Dept: SURGERY | Facility: AMBULATORY SURGERY CENTER | Age: 85
Discharge: HOME | End: 2020-01-10
Attending: COLON & RECTAL SURGERY | Admitting: COLON & RECTAL SURGERY

## 2020-01-10 DIAGNOSIS — D12.8 BENIGN NEOPLASM OF RECTUM: ICD-10-CM

## 2020-01-28 ENCOUNTER — RECORDS - HEALTHEAST (OUTPATIENT)
Dept: ADMINISTRATIVE | Facility: OTHER | Age: 85
End: 2020-01-28

## 2020-01-31 ENCOUNTER — OFFICE VISIT - HEALTHEAST (OUTPATIENT)
Dept: INTERNAL MEDICINE | Facility: CLINIC | Age: 85
End: 2020-01-31

## 2020-01-31 DIAGNOSIS — I25.9 IHD (ISCHEMIC HEART DISEASE): ICD-10-CM

## 2020-01-31 DIAGNOSIS — C20 MALIGNANT TUMOR OF RECTUM (H): ICD-10-CM

## 2020-01-31 DIAGNOSIS — N42.31 PIN (PROSTATIC INTRAEPITHELIAL NEOPLASIA): ICD-10-CM

## 2020-01-31 DIAGNOSIS — I35.0 MODERATE AORTIC STENOSIS BY PRIOR ECHOCARDIOGRAPHY: ICD-10-CM

## 2020-01-31 DIAGNOSIS — I10 ESSENTIAL HYPERTENSION: ICD-10-CM

## 2020-01-31 DIAGNOSIS — R09.89 BILATERAL CAROTID BRUITS: ICD-10-CM

## 2020-01-31 DIAGNOSIS — E78.5 HYPERLIPIDEMIA, UNSPECIFIED HYPERLIPIDEMIA TYPE: ICD-10-CM

## 2020-01-31 LAB
ALBUMIN SERPL-MCNC: 3.9 G/DL (ref 3.5–5)
ALP SERPL-CCNC: 69 U/L (ref 45–120)
ALT SERPL W P-5'-P-CCNC: 22 U/L (ref 0–45)
ANION GAP SERPL CALCULATED.3IONS-SCNC: 11 MMOL/L (ref 5–18)
AST SERPL W P-5'-P-CCNC: 21 U/L (ref 0–40)
BASOPHILS # BLD AUTO: 0 THOU/UL (ref 0–0.2)
BASOPHILS NFR BLD AUTO: 1 % (ref 0–2)
BILIRUB DIRECT SERPL-MCNC: 0.2 MG/DL
BILIRUB SERPL-MCNC: 0.5 MG/DL (ref 0–1)
BUN SERPL-MCNC: 27 MG/DL (ref 8–28)
CALCIUM SERPL-MCNC: 9.8 MG/DL (ref 8.5–10.5)
CHLORIDE BLD-SCNC: 107 MMOL/L (ref 98–107)
CHOLEST SERPL-MCNC: 123 MG/DL
CO2 SERPL-SCNC: 24 MMOL/L (ref 22–31)
CREAT SERPL-MCNC: 1.17 MG/DL (ref 0.7–1.3)
EOSINOPHIL # BLD AUTO: 0.2 THOU/UL (ref 0–0.4)
EOSINOPHIL NFR BLD AUTO: 3 % (ref 0–6)
ERYTHROCYTE [DISTWIDTH] IN BLOOD BY AUTOMATED COUNT: 12.6 % (ref 11–14.5)
FASTING STATUS PATIENT QL REPORTED: YES
GFR SERPL CREATININE-BSD FRML MDRD: 58 ML/MIN/1.73M2
GLUCOSE BLD-MCNC: 107 MG/DL (ref 70–125)
HCT VFR BLD AUTO: 40.2 % (ref 40–54)
HDLC SERPL-MCNC: 48 MG/DL
HGB BLD-MCNC: 13.3 G/DL (ref 14–18)
LDLC SERPL CALC-MCNC: 62 MG/DL
LYMPHOCYTES # BLD AUTO: 1.3 THOU/UL (ref 0.8–4.4)
LYMPHOCYTES NFR BLD AUTO: 21 % (ref 20–40)
MCH RBC QN AUTO: 30.9 PG (ref 27–34)
MCHC RBC AUTO-ENTMCNC: 33.1 G/DL (ref 32–36)
MCV RBC AUTO: 94 FL (ref 80–100)
MONOCYTES # BLD AUTO: 0.7 THOU/UL (ref 0–0.9)
MONOCYTES NFR BLD AUTO: 11 % (ref 2–10)
NEUTROPHILS # BLD AUTO: 4 THOU/UL (ref 2–7.7)
NEUTROPHILS NFR BLD AUTO: 64 % (ref 50–70)
PLATELET # BLD AUTO: 163 THOU/UL (ref 140–440)
PMV BLD AUTO: 11.8 FL (ref 8.5–12.5)
POTASSIUM BLD-SCNC: 4.8 MMOL/L (ref 3.5–5)
PROT SERPL-MCNC: 7.2 G/DL (ref 6–8)
RBC # BLD AUTO: 4.3 MILL/UL (ref 4.4–6.2)
SODIUM SERPL-SCNC: 142 MMOL/L (ref 136–145)
TRIGL SERPL-MCNC: 65 MG/DL
WBC: 6.3 THOU/UL (ref 4–11)

## 2020-01-31 ASSESSMENT — MIFFLIN-ST. JEOR: SCORE: 1226.86

## 2020-02-03 ENCOUNTER — COMMUNICATION - HEALTHEAST (OUTPATIENT)
Dept: INTERNAL MEDICINE | Facility: CLINIC | Age: 85
End: 2020-02-03

## 2020-02-06 ENCOUNTER — COMMUNICATION - HEALTHEAST (OUTPATIENT)
Dept: INTERNAL MEDICINE | Facility: CLINIC | Age: 85
End: 2020-02-06

## 2020-02-11 ENCOUNTER — HOSPITAL ENCOUNTER (OUTPATIENT)
Dept: CARDIOLOGY | Facility: HOSPITAL | Age: 85
Discharge: HOME | End: 2020-02-11
Attending: INTERNAL MEDICINE

## 2020-02-11 ENCOUNTER — HOSPITAL ENCOUNTER (OUTPATIENT)
Dept: ULTRASOUND IMAGING | Facility: HOSPITAL | Age: 85
Discharge: HOME | End: 2020-02-11
Attending: INTERNAL MEDICINE

## 2020-02-11 DIAGNOSIS — I35.0 MODERATE AORTIC STENOSIS BY PRIOR ECHOCARDIOGRAPHY: ICD-10-CM

## 2020-02-11 DIAGNOSIS — R09.89 BILATERAL CAROTID BRUITS: ICD-10-CM

## 2020-02-11 LAB
AORTIC ROOT: 3.4 CM
AORTIC VALVE MEAN VELOCITY: 177 CM/S
AR DECEL SLOPE: 2130 MM/S2
AR PEAK VELOCITY: 343 CM/S
ASCENDING AORTA: 3.3 CM
AV DIMENSIONLESS INDEX VTI: 0.5
AV MEAN GRADIENT: 26 MMHG
AV PEAK GRADIENT: 45.4 MMHG
AV REGURGITANT PEAK GRADIENT: 47.1 MMHG
AV REGURGITATION PRESSURE HALF TIME: 471 MS
AV VALVE AREA: 1.6 CM2
BSA FOR ECHO PROCEDURE: 1.73 M2
CV BLOOD PRESSURE: NORMAL MMHG
CV ECHO HEIGHT: 66 IN
CV ECHO WEIGHT: 142 LBS
DOP CALC AO PEAK VEL: 337 CM/S
DOP CALC AO VTI: 56 CM
DOP CALC LVOT AREA: 3.14 CM2
DOP CALC LVOT DIAMETER: 2 CM
DOP CALC LVOT STROKE VOLUME: 87.3 CM3
DOP CALC MV VTI: 33.4 CM
DOP CALCLVOT PEAK VEL VTI: 27.8 CM
EJECTION FRACTION: 55 % (ref 55–75)
FRACTIONAL SHORTENING: 31 % (ref 28–44)
INTERVENTRICULAR SEPTUM IN END DIASTOLE: 0.9 CM (ref 0.6–1)
IVS/PW RATIO: 0.9
LA AREA 1: 14.7 CM2
LA AREA 2: 17 CM2
LEFT ATRIUM LENGTH: 3.76 CM
LEFT ATRIUM SIZE: 3.6 CM
LEFT ATRIUM TO AORTIC ROOT RATIO: 1.06 NO UNITS
LEFT ATRIUM VOLUME INDEX: 32.7 ML/M2
LEFT ATRIUM VOLUME: 56.5 ML
LEFT VENTRICLE CARDIAC INDEX: 2.9 L/MIN/M2
LEFT VENTRICLE CARDIAC OUTPUT: 5.1 L/MIN
LEFT VENTRICLE DIASTOLIC VOLUME INDEX: 38.2 CM3/M2 (ref 34–74)
LEFT VENTRICLE DIASTOLIC VOLUME: 66 CM3 (ref 62–150)
LEFT VENTRICLE HEART RATE: 58 BPM
LEFT VENTRICLE MASS INDEX: 73.9 G/M2
LEFT VENTRICLE SYSTOLIC VOLUME INDEX: 17.3 CM3/M2 (ref 11–31)
LEFT VENTRICLE SYSTOLIC VOLUME: 30 CM3 (ref 21–61)
LEFT VENTRICULAR INTERNAL DIMENSION IN DIASTOLE: 4.2 CM (ref 4.2–5.8)
LEFT VENTRICULAR INTERNAL DIMENSION IN SYSTOLE: 2.9 CM (ref 2.5–4)
LEFT VENTRICULAR MASS: 127.8 G
LEFT VENTRICULAR OUTFLOW TRACT MEAN GRADIENT: 3 MMHG
LEFT VENTRICULAR OUTFLOW TRACT MEAN VELOCITY: 76.1 CM/S
LEFT VENTRICULAR POSTERIOR WALL IN END DIASTOLE: 1 CM (ref 0.6–1)
LV STROKE VOLUME INDEX: 50.5 ML/M2
MITRAL VALVE E/A RATIO: 0.6
MITRAL VALVE MEAN INFLOW VELOCITY: 55.9 CM/S
MITRAL VALVE PEAK VELOCITY: 124 CM/S
MV AREA VTI: 2.61 CM2
MV AVERAGE E/E' RATIO: 13.3 CM/S
MV DECELERATION TIME: 317 MS
MV E'TISSUE VEL-LAT: 5.7 CM/S
MV E'TISSUE VEL-MED: 4.66 CM/S
MV LATERAL E/E' RATIO: 12.1
MV MEAN GRADIENT: 2 MMHG
MV MEDIAL E/E' RATIO: 14.8
MV PEAK A VELOCITY: 122 CM/S
MV PEAK E VELOCITY: 69.1 CM/S
MV PEAK GRADIENT: 6.2 MMHG
MV VALVE AREA BY CONTINUITY EQUATION: 2.6 CM2
NUC REST DIASTOLIC VOLUME INDEX: 2272 LBS
NUC REST SYSTOLIC VOLUME INDEX: 66 IN
TRICUSPID REGURGITATION PEAK PRESSURE GRADIENT: 21.7 MMHG
TRICUSPID VALVE ANULAR PLANE SYSTOLIC EXCURSION: 2.4 CM
TRICUSPID VALVE PEAK REGURGITANT VELOCITY: 233 CM/S

## 2020-02-11 ASSESSMENT — MIFFLIN-ST. JEOR: SCORE: 1226.86

## 2020-02-14 ENCOUNTER — AMBULATORY - HEALTHEAST (OUTPATIENT)
Dept: INTERNAL MEDICINE | Facility: CLINIC | Age: 85
End: 2020-02-14

## 2020-02-20 ENCOUNTER — COMMUNICATION - HEALTHEAST (OUTPATIENT)
Dept: INTERNAL MEDICINE | Facility: CLINIC | Age: 85
End: 2020-02-20

## 2020-03-05 ENCOUNTER — COMMUNICATION - HEALTHEAST (OUTPATIENT)
Dept: INTERNAL MEDICINE | Facility: CLINIC | Age: 85
End: 2020-03-05

## 2020-03-05 DIAGNOSIS — I25.9 IHD (ISCHEMIC HEART DISEASE): ICD-10-CM

## 2020-03-05 DIAGNOSIS — I10 ESSENTIAL HYPERTENSION: ICD-10-CM

## 2020-05-15 ENCOUNTER — OFFICE VISIT - HEALTHEAST (OUTPATIENT)
Dept: INTERNAL MEDICINE | Facility: CLINIC | Age: 85
End: 2020-05-15

## 2020-05-15 DIAGNOSIS — E78.5 HYPERLIPIDEMIA, UNSPECIFIED HYPERLIPIDEMIA TYPE: ICD-10-CM

## 2020-05-15 DIAGNOSIS — I35.0 MODERATE AORTIC STENOSIS BY PRIOR ECHOCARDIOGRAPHY: ICD-10-CM

## 2020-05-15 DIAGNOSIS — D49.0 RECTAL TUMOR: ICD-10-CM

## 2020-05-15 DIAGNOSIS — I25.9 IHD (ISCHEMIC HEART DISEASE): ICD-10-CM

## 2020-05-15 DIAGNOSIS — I10 ESSENTIAL HYPERTENSION: ICD-10-CM

## 2020-05-15 DIAGNOSIS — D69.6 THROMBOCYTOPENIA (H): ICD-10-CM

## 2020-11-19 ENCOUNTER — COMMUNICATION - HEALTHEAST (OUTPATIENT)
Dept: SCHEDULING | Facility: CLINIC | Age: 85
End: 2020-11-19

## 2020-12-08 ENCOUNTER — OFFICE VISIT - HEALTHEAST (OUTPATIENT)
Dept: INTERNAL MEDICINE | Facility: CLINIC | Age: 85
End: 2020-12-08

## 2020-12-08 DIAGNOSIS — N42.31 PIN (PROSTATIC INTRAEPITHELIAL NEOPLASIA): ICD-10-CM

## 2020-12-08 DIAGNOSIS — Z09 HOSPITAL DISCHARGE FOLLOW-UP: ICD-10-CM

## 2020-12-08 DIAGNOSIS — I10 ESSENTIAL HYPERTENSION: ICD-10-CM

## 2020-12-08 DIAGNOSIS — D69.6 THROMBOCYTOPENIA (H): ICD-10-CM

## 2020-12-08 DIAGNOSIS — K59.00 CONSTIPATION, UNSPECIFIED CONSTIPATION TYPE: ICD-10-CM

## 2021-01-14 ENCOUNTER — AMBULATORY - HEALTHEAST (OUTPATIENT)
Dept: CARE COORDINATION | Facility: CLINIC | Age: 86
End: 2021-01-14

## 2021-01-14 DIAGNOSIS — R65.21 SEPTIC SHOCK (H): ICD-10-CM

## 2021-01-14 DIAGNOSIS — A41.9 SEPTIC SHOCK (H): ICD-10-CM

## 2021-01-15 ENCOUNTER — COMMUNICATION - HEALTHEAST (OUTPATIENT)
Dept: NURSING | Facility: CLINIC | Age: 86
End: 2021-01-15

## 2021-01-25 ENCOUNTER — COMMUNICATION - HEALTHEAST (OUTPATIENT)
Dept: NURSING | Facility: CLINIC | Age: 86
End: 2021-01-25

## 2021-02-01 ENCOUNTER — COMMUNICATION - HEALTHEAST (OUTPATIENT)
Dept: CARE COORDINATION | Facility: CLINIC | Age: 86
End: 2021-02-01

## 2021-02-01 ASSESSMENT — ACTIVITIES OF DAILY LIVING (ADL): DEPENDENT_IADLS:: INDEPENDENT

## 2021-02-08 ENCOUNTER — COMMUNICATION - HEALTHEAST (OUTPATIENT)
Dept: NURSING | Facility: CLINIC | Age: 86
End: 2021-02-08

## 2021-02-22 ENCOUNTER — COMMUNICATION - HEALTHEAST (OUTPATIENT)
Dept: NURSING | Facility: CLINIC | Age: 86
End: 2021-02-22

## 2021-02-24 ENCOUNTER — COMMUNICATION - HEALTHEAST (OUTPATIENT)
Dept: CARE COORDINATION | Facility: CLINIC | Age: 86
End: 2021-02-24

## 2021-02-24 DIAGNOSIS — I25.9 IHD (ISCHEMIC HEART DISEASE): ICD-10-CM

## 2021-02-24 DIAGNOSIS — I10 HTN (HYPERTENSION): ICD-10-CM

## 2021-02-24 DIAGNOSIS — N39.0 SEPSIS DUE TO URINARY TRACT INFECTION (H): ICD-10-CM

## 2021-02-24 DIAGNOSIS — A41.9 SEPSIS DUE TO URINARY TRACT INFECTION (H): ICD-10-CM

## 2021-02-26 ENCOUNTER — COMMUNICATION - HEALTHEAST (OUTPATIENT)
Dept: NURSING | Facility: CLINIC | Age: 86
End: 2021-02-26

## 2021-03-18 ENCOUNTER — COMMUNICATION - HEALTHEAST (OUTPATIENT)
Dept: INTERNAL MEDICINE | Facility: CLINIC | Age: 86
End: 2021-03-18

## 2021-03-18 DIAGNOSIS — I25.9 IHD (ISCHEMIC HEART DISEASE): ICD-10-CM

## 2021-03-25 ENCOUNTER — COMMUNICATION - HEALTHEAST (OUTPATIENT)
Dept: ADMINISTRATIVE | Facility: CLINIC | Age: 86
End: 2021-03-25

## 2021-03-30 ENCOUNTER — OFFICE VISIT - HEALTHEAST (OUTPATIENT)
Dept: INTERNAL MEDICINE | Facility: CLINIC | Age: 86
End: 2021-03-30

## 2021-03-30 DIAGNOSIS — I10 ESSENTIAL HYPERTENSION: ICD-10-CM

## 2021-03-30 DIAGNOSIS — N42.31 PIN (PROSTATIC INTRAEPITHELIAL NEOPLASIA): ICD-10-CM

## 2021-03-30 DIAGNOSIS — I25.9 IHD (ISCHEMIC HEART DISEASE): ICD-10-CM

## 2021-03-30 RX ORDER — FINASTERIDE 5 MG/1
5 TABLET, FILM COATED ORAL DAILY
Qty: 90 TABLET | Refills: 3 | Status: SHIPPED | OUTPATIENT
Start: 2021-03-30

## 2021-03-30 RX ORDER — METOPROLOL SUCCINATE 50 MG/1
TABLET, EXTENDED RELEASE ORAL
Qty: 90 TABLET | Refills: 3 | Status: SHIPPED | OUTPATIENT
Start: 2021-03-30

## 2021-03-30 RX ORDER — SPIRONOLACTONE 50 MG/1
50 TABLET, FILM COATED ORAL DAILY
Qty: 90 TABLET | Refills: 3 | Status: SHIPPED | OUTPATIENT
Start: 2021-03-30

## 2021-03-30 RX ORDER — ATORVASTATIN CALCIUM 10 MG/1
10 TABLET, FILM COATED ORAL DAILY
Qty: 90 TABLET | Refills: 3 | Status: SHIPPED | OUTPATIENT
Start: 2021-03-30

## 2021-03-30 RX ORDER — NITROGLYCERIN 0.4 MG/1
0.4 TABLET SUBLINGUAL EVERY 5 MIN PRN
Qty: 25 TABLET | Refills: 12 | Status: SHIPPED | OUTPATIENT
Start: 2021-03-30

## 2021-05-24 ENCOUNTER — RECORDS - HEALTHEAST (OUTPATIENT)
Dept: ADMINISTRATIVE | Facility: CLINIC | Age: 86
End: 2021-05-24

## 2021-05-27 VITALS — DIASTOLIC BLOOD PRESSURE: 62 MMHG | SYSTOLIC BLOOD PRESSURE: 114 MMHG

## 2021-05-27 NOTE — PROGRESS NOTES
OFFICE VISIT NOTE  Ike Cartagena   89 y.o. male            Assessment/Plan for  Ike Cartagena is a 89 y.o. male.  No Patient Care Coordination Note on file.       1. IHD (ischemic heart disease)  Stable without angina  - atorvastatin (LIPITOR) 10 MG tablet; Take 1 tablet (10 mg total) by mouth daily.  Dispense: 90 tablet; Refill: 3  - metoprolol succinate (TOPROL-XL) 50 MG 24 hr tablet; Take 1 tablet (50 mg total) by mouth daily.  Dispense: 90 tablet; Refill: 3  - omeprazole (PRILOSEC) 20 MG capsule; Take 1 capsule (20 mg total) by mouth daily.  Dispense: 90 capsule; Refill: 3    2. Essential hypertension  Excellent control  - spironolactone (ALDACTONE) 50 MG tablet; Take 1 tablet (50 mg total) by mouth daily.  Dispense: 90 tablet; Refill: 3    3. Moderate aortic stenosis by prior echocardiography  Clinically asymptomatic.  Reviewed recent echo    4. Hyperlipidemia, unspecified hyperlipidemia type  At goal    5. Adenomatous rectal polyp  Recent uneventful surgery    6.  Mild thrombocytopenia    Plan:  Continue current Rx  Laboratory  Clinic visit return 3 months  No new Rx    There are no Patient Instructions on file for this visit.    Diagnoses and all orders for this visit:    Moderate aortic stenosis by prior echocardiography    IHD (ischemic heart disease)  -     atorvastatin (LIPITOR) 10 MG tablet  Dispense: 90 tablet; Refill: 3  -     metoprolol succinate (TOPROL-XL) 50 MG 24 hr tablet  Dispense: 90 tablet; Refill: 3  -     omeprazole (PRILOSEC) 20 MG capsule  Dispense: 90 capsule; Refill: 3    Essential hypertension  -     spironolactone (ALDACTONE) 50 MG tablet  Dispense: 90 tablet; Refill: 3    Hyperlipidemia, unspecified hyperlipidemia type    Adenomatous rectal polyp        Medications after visit  Current Outpatient Medications   Medication Sig Dispense Refill     ascorbic acid (VITAMIN C) 100 MG tablet Take 1,000 mg by mouth daily.        aspirin 81 MG EC tablet Take 81 mg by mouth daily.        atorvastatin (LIPITOR) 10 MG tablet Take 1 tablet (10 mg total) by mouth at bedtime. 90 tablet 3     finasteride (PROSCAR) 5 mg tablet Take 1 tablet (5 mg total) by mouth daily. 90 tablet 3     metoprolol succinate (TOPROL-XL) 50 MG 24 hr tablet Take 1 tablet (50 mg total) by mouth daily. 90 tablet 3     multivitamin therapeutic (THERAGRAN) tablet Take 1 tablet by mouth daily.       nitroglycerin (NITROSTAT) 0.4 MG SL tablet Place 1 tablet (0.4 mg total) under the tongue every 5 (five) minutes as needed for chest pain. 25 tablet 12     OMEGA-3/DHA/EPA/FISH OIL (FISH OIL-OMEGA-3 FATTY ACIDS) 300-1,000 mg capsule Take 1 g by mouth daily.        omeprazole (PRILOSEC) 20 MG capsule Take 1 capsule (20 mg total) by mouth daily. 90 capsule 3     spironolactone (ALDACTONE) 50 MG tablet Take 1 tablet (50 mg total) by mouth daily. 90 tablet 3     UBIDECARENONE/VITAMIN E MIXED (COQ10  ORAL) Take 100 mg by mouth daily.       No current facility-administered medications for this visit.                       Augustin Lagunas MD  Internal medicine  [facility]  [phone]  [email or web address]    Much or all of the text in this note was generated through the use of Dragon Dictate voice-to-text software. Errors in spelling or words which seem out of context are unintentional.   Sound alike errors, in particular, may have escaped editing.                 Subjective:   Chief Complaint:  Hypertension; Follow-up (Routine 3m visit); and Medication Refill    Recent removal of adenomatous polyp well.  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 No change in  nitroglycerin usage    Hypertension-There are no cardiovascular, respiratory, neurologic complaints.No claudication.There is no orthostasis.Patient is compliant with medications.  Medications reviewed.   No side effects from medication.  Hyperlipoproteinemia-patient is tolerating medication.  There are no myalgia, arthralgia, weakness.  No Bowel issues.  Liver profile has been normal.  Patient has met cholesterol goals.  Lab Results   Component Value Date    LDLCALC 59 12/07/2018         Review of Systems:     Extensive 10-point review of systems was performed. Please see the HPI for problem specific pertinent review of systems.     Patient does note appetite is good.  No rectal bleeding.  No bleeding issues with aspirin     Aspirin monitoring-taking daily as directed.  No dyspepsia, abdominal pain, no melena, hematochezia.  No bleeding.  No excessive bruising.    Otherwise, the following systems are noncontributory including constitutional, eyes, ears, nose and throat, cardiovascular, respiratory, gastrointestinal, genitourinary, musculoskeletal,neurological, skin and/or breast, endocrine, hematologic/lymph, allergic/immunologic and psychiatric.              Medications:  Current Outpatient Medications on File Prior to Visit   Medication Sig     ascorbic acid (VITAMIN C) 100 MG tablet Take 1,000 mg by mouth daily.      aspirin 81 MG EC tablet Take 81 mg by mouth daily.     atorvastatin (LIPITOR) 10 MG tablet Take 1 tablet (10 mg total) by mouth at bedtime.     finasteride (PROSCAR) 5 mg tablet Take 1 tablet (5 mg total) by mouth daily.     metoprolol succinate (TOPROL-XL) 50 MG 24 hr tablet Take 1 tablet (50 mg total) by mouth daily.     multivitamin therapeutic (THERAGRAN) tablet Take 1 tablet by mouth daily.     nitroglycerin (NITROSTAT) 0.4 MG SL tablet Place 1 tablet (0.4 mg total) under the tongue every 5 (five) minutes as needed for chest pain.     OMEGA-3/DHA/EPA/FISH OIL (FISH OIL-OMEGA-3 FATTY ACIDS)  "300-1,000 mg capsule Take 1 g by mouth daily.      omeprazole (PRILOSEC) 20 MG capsule Take 1 capsule (20 mg total) by mouth daily.     spironolactone (ALDACTONE) 50 MG tablet Take 1 tablet (50 mg total) by mouth daily.     UBIDECARENONE/VITAMIN E MIXED (COQ10  ORAL) Take 100 mg by mouth daily.     [DISCONTINUED] finasteride (PROSCAR) 5 mg tablet TAKE 1 TABLET DAILY     No current facility-administered medications on file prior to visit.             Allergies:No Known Allergies    PSFHx: Tobacco Status:  He  reports that he quit smoking about 51 years ago. he has never used smokeless tobacco.   Alcohol Status:    Social History     Substance and Sexual Activity   Alcohol Use Yes    Comment: occassional       reports that he quit smoking about 51 years ago. he has never used smokeless tobacco. He reports that he drinks alcohol. He reports that he does not use drugs.    Objective:    /60 (Patient Site: Right Arm, Patient Position: Sitting, Cuff Size: Adult Regular)   Pulse 73   Ht 5' 6\" (1.676 m)   Wt 140 lb 0.6 oz (63.5 kg)   SpO2 94%   BMI 22.60 kg/m    Weight:   Wt Readings from Last 3 Encounters:   04/05/19 140 lb 0.6 oz (63.5 kg)   01/03/19 141 lb (64 kg)   12/07/18 142 lb 1.9 oz (64.5 kg)     BP Readings from Last 10 Encounters:   04/05/19 126/60   01/03/19 119/58   12/07/18 138/60   11/05/18 130/68   10/26/18 136/70   07/20/18 138/70   04/13/18 132/70   01/12/18 140/88   10/06/17 122/78   06/30/17 112/64         General-appears well, no acute distress.    pulse is regular  Skin: Normal. No rash or lesion  Head:  Normocephalic, symmetric  Speech-clear  Eyes: Eyes midline full EOM.  External exams normal.  No icterus  Neck:  No palpable masses, lymphadenopathy or tenderness. No thyromegaly or goiter  Carotid Arteries:  Equal pulsations bilateral.No Bruit, normal upstroke  Chest Wall: No deformity or pain elicited on compression.  Respiratory:  Normal respiratory effort.  Lungs are clear with good " breath sounds.  No dullness.  No wheezing.  Heart: Regular rhythm.  Normal sounding S1, S2 without S3, S4, murmurs, rubs, or gallops.  Extremities-no edema  Gait normal.         Review of clinical lab tests  Lab Results   Component Value Date    WBC 6.0 12/07/2018    HGB 13.9 (L) 12/07/2018    HCT 41.3 12/07/2018     (L) 12/07/2018    CHOL 124 12/07/2018    TRIG 76 12/07/2018    HDL 50 12/07/2018    ALT 22 10/26/2018    AST 22 10/26/2018     12/07/2018    K 4.1 12/07/2018     12/07/2018    CREATININE 1.01 12/07/2018    BUN 30 (H) 12/07/2018    CO2 25 12/07/2018    TSH 2.50 10/26/2018    PSA 4.2 12/23/2016    INR 1.03 04/18/2011         Lab Results   Component Value Date    LDLCALC 59 12/07/2018         Glucose   Date/Time Value Ref Range Status   12/07/2018 09:46  70 - 125 mg/dL Final   10/26/2018 08:55  70 - 125 mg/dL Final   07/20/2018 08:57  70 - 125 mg/dL Final   04/13/2018 09:03  70 - 125 mg/dL Final   01/12/2018 08:56  70 - 125 mg/dL Final   10/06/2017 08:33  70 - 125 mg/dL Final   06/30/2017 09:07  70 - 125 mg/dL Final   03/24/2017 08:19  70 - 125 mg/dL Final   12/23/2016 08:48  70 - 125 mg/dL Final   06/10/2016 10:11  70 - 125 mg/dL Final     No results found for this or any previous visit (from the past 24 hour(s)).    RADIOLOGY: No results found.    Review of recent consultation-reviewed operative note.  Reviewed pathology.

## 2021-05-28 ENCOUNTER — RECORDS - HEALTHEAST (OUTPATIENT)
Dept: ADMINISTRATIVE | Facility: CLINIC | Age: 86
End: 2021-05-28

## 2021-05-30 VITALS — HEIGHT: 66 IN | WEIGHT: 153 LBS | BODY MASS INDEX: 24.59 KG/M2

## 2021-05-30 NOTE — TELEPHONE ENCOUNTER
----- Message from Augustin Lagunas MD sent at 7/12/2019  3:25 PM CDT -----  Please call  Lab is excellent

## 2021-05-30 NOTE — PROGRESS NOTES
OFFICE VISIT NOTE  Ike Cartagena   89 y.o. male            Assessment/Plan for  Ike Cartagena is a 89 y.o. male.  No Patient Care Coordination Note on file.       1. IHD (ischemic heart disease)  Stable doing well without angina  - nitroglycerin (NITROSTAT) 0.4 MG SL tablet; Place 1 tablet (0.4 mg total) under the tongue every 5 (five) minutes as needed for chest pain.  Dispense: 25 tablet; Refill: 12    2. Essential hypertension  Excellent control without orthostasis    3. Moderate aortic stenosis by prior echocardiography  Moderate.  Asymptomatic.  Last echo 10/18    4. Hyperlipidemia, unspecified hyperlipidemia type  At goal LDL less than 70    5. Adenomatous rectal polyp  Regular surveillance by colorectal    6. Thrombocytopenia (H)  Stable.  No bleeding issues.  No evidence of primary liver disease.  This is a mild issue      7.  Bilateral carotid bruits.  Possible transmitted aortic murmur.  Check ultrasound.  No TIA symptoms      Plan:  Continue current Rx  Laboratory  Carotid artery ultrasound-bilateral bruit  Refill nitro  Clinic 3 months    There are no Patient Instructions on file for this visit.    Diagnoses and all orders for this visit:    Essential hypertension    IHD (ischemic heart disease)  -     nitroglycerin (NITROSTAT) 0.4 MG SL tablet; Place 1 tablet (0.4 mg total) under the tongue every 5 (five) minutes as needed for chest pain.  Dispense: 25 tablet; Refill: 12    Moderate aortic stenosis by prior echocardiography    Hyperlipidemia, unspecified hyperlipidemia type    Adenomatous rectal polyp    Thrombocytopenia (H)        Medications after visit  Current Outpatient Medications   Medication Sig Dispense Refill     ascorbic acid (VITAMIN C) 100 MG tablet Take 1,000 mg by mouth daily.        aspirin 81 MG EC tablet Take 81 mg by mouth daily.       atorvastatin (LIPITOR) 10 MG tablet Take 1 tablet (10 mg total) by mouth daily. 90 tablet 3     finasteride (PROSCAR) 5 mg tablet Take 1 tablet (5  mg total) by mouth daily. 90 tablet 3     metoprolol succinate (TOPROL-XL) 50 MG 24 hr tablet Take 1 tablet (50 mg total) by mouth daily. 90 tablet 3     multivitamin therapeutic (THERAGRAN) tablet Take 1 tablet by mouth daily.       nitroglycerin (NITROSTAT) 0.4 MG SL tablet Place 1 tablet (0.4 mg total) under the tongue every 5 (five) minutes as needed for chest pain. 25 tablet 12     OMEGA-3/DHA/EPA/FISH OIL (FISH OIL-OMEGA-3 FATTY ACIDS) 300-1,000 mg capsule Take 1 g by mouth daily.        omeprazole (PRILOSEC) 20 MG capsule Take 1 capsule (20 mg total) by mouth daily. 90 capsule 3     spironolactone (ALDACTONE) 50 MG tablet Take 1 tablet (50 mg total) by mouth daily. 90 tablet 3     UBIDECARENONE/VITAMIN E MIXED (COQ10  ORAL) Take 100 mg by mouth daily.       No current facility-administered medications for this visit.                       Augustin Lagunas MD  Internal medicine  [facility]  [phone]  [email or web address]    Much or all of the text in this note was generated through the use of Dragon Dictate voice-to-text software. Errors in spelling or words which seem out of context are unintentional.   Sound alike errors, in particular, may have escaped editing.                 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  They are at home.  He is doing okay    No angina  ISCHEMIC HEART DISEASE- Other than reported, the patient is not having any angina, chest pain, epigastric pain, dyspnea, palpitation, lightheadedness, syncope, excessive fatigue, exertional diaphoresis.-The patient is taking medication as prescribed. No change in nitroglycerin usage  Moderate l aortic stenosis-echo-asymptomatic without dizziness TIA symptomatology heart failure  some  Pathology.    Hypertension-There are no cardiovascular, respiratory, neurologic complaints.No claudication.There is no orthostasis.Patient is compliant with medications.  Medications reviewed.   No side effects from medication.    Hyperlipoproteinemia-patient is tolerating medication.  There are no myalgia, arthralgia, weakness.  No Bowel issues.  Liver profile has been normal.  Patient has met cholesterol goals.      Bowels regular.  Regular fulguration of colorectal polyp-Dr. Moss    Mild thrombocytopenia.  No clinical issues.  Hemoglobin white count normal.  Has approximately 2 ounces of scotch nightly which is a lifetime pattern  Review of Systems:     Extensive 10-point review of systems was performed. Please see the HPI for problem specific pertinent review of systems.     Patient does note he is doing okay    Otherwise, the following systems are noncontributory including constitutional, eyes, ears, nose and throat, cardiovascular, respiratory, gastrointestinal, genitourinary, musculoskeletal,neurological, skin and/or breast, endocrine, hematologic/lymph, allergic/immunologic and psychiatric.              Medications:  Current Outpatient Medications on File Prior to Visit   Medication Sig     ascorbic acid (VITAMIN C) 100 MG tablet Take 1,000 mg by mouth daily.      aspirin 81 MG EC tablet Take 81 mg by mouth daily.     atorvastatin (LIPITOR) 10 MG tablet Take 1 tablet (10 mg total) by mouth daily.     finasteride (PROSCAR) 5 mg tablet Take 1 tablet (5 mg total) by mouth daily.     metoprolol succinate (TOPROL-XL) 50 MG 24 hr tablet Take 1 tablet (50 mg total) by mouth daily.     multivitamin therapeutic (THERAGRAN) tablet Take 1 tablet by mouth daily.     nitroglycerin (NITROSTAT) 0.4 MG SL tablet Place 1 tablet (0.4 mg total) under the tongue every 5 (five) minutes as needed for chest pain.     OMEGA-3/DHA/EPA/FISH OIL (FISH OIL-OMEGA-3 FATTY ACIDS) 300-1,000 mg capsule Take 1 g by mouth daily.   "    omeprazole (PRILOSEC) 20 MG capsule Take 1 capsule (20 mg total) by mouth daily.     spironolactone (ALDACTONE) 50 MG tablet Take 1 tablet (50 mg total) by mouth daily.     UBIDECARENONE/VITAMIN E MIXED (COQ10  ORAL) Take 100 mg by mouth daily.     No current facility-administered medications on file prior to visit.             Allergies:No Known Allergies    PSFHx: Tobacco Status:  He  reports that he quit smoking about 51 years ago. He has never used smokeless tobacco.   Alcohol Status:    Social History     Substance and Sexual Activity   Alcohol Use Yes    Comment: occassional       reports that he quit smoking about 51 years ago. He has never used smokeless tobacco. He reports that he drinks alcohol. He reports that he does not use drugs.    Objective:    /70 (Patient Position: Standing)   Pulse (!) 57   Ht 5' 6\" (1.676 m)   Wt 141 lb 1.9 oz (64 kg)   SpO2 97%   BMI 22.78 kg/m    Weight:   Wt Readings from Last 3 Encounters:   07/12/19 141 lb 1.9 oz (64 kg)   04/05/19 140 lb 0.6 oz (63.5 kg)   01/03/19 141 lb (64 kg)     BP Readings from Last 10 Encounters:   07/12/19 128/70   04/05/19 126/60   01/03/19 119/58   12/07/18 138/60   11/05/18 130/68   10/26/18 136/70   07/20/18 138/70   04/13/18 132/70   01/12/18 140/88   10/06/17 122/78         General-appears well, no acute distress.  Vitals:    07/12/19 0801 07/12/19 0814 07/12/19 0815   BP: 118/58 128/70 128/70   Patient Site: Right Arm     Patient Position: Sitting  Standing   Cuff Size: Adult Regular     Pulse: (!) 57     SpO2: 97%     Weight: 141 lb 1.9 oz (64 kg)     Height: 5' 6\" (1.676 m)       Skin: Normal. No rash or lesion  Head:  Normocephalic, symmetric  Speech-clear  Eyes: Eyes midline full EOM.  External exams normal.  No icterus  Neck:  No palpable masses, lymphadenopathy or tenderness. No thyromegaly or goiter  Carotid Arteries: Equal pulsations bilateral.  Bilateral bruit.  Slightly decreased upstroke  Chest Wall: No " deformity or pain elicited on compression.  Respiratory:  Normal respiratory effort.  Lungs are clear with good breath sounds.  No dullness.  No wheezing.  Heart: Regular rhythm.  Normal sounding S1, S2 without S3, S4, 3/6 aortic stenosis best heard at base.  Negative aortic insufficiency  Abdomen benign without organomegaly  Extremities no edema  Gait normal        Review of clinical lab tests  Lab Results   Component Value Date    WBC 6.0 12/07/2018    HGB 13.9 (L) 12/07/2018    HCT 41.3 12/07/2018     (L) 12/07/2018    CHOL 124 12/07/2018    TRIG 76 12/07/2018    HDL 50 12/07/2018    ALT 22 10/26/2018    AST 22 10/26/2018     04/05/2019    K 4.4 04/05/2019     04/05/2019    CREATININE 1.13 04/05/2019    BUN 29 (H) 04/05/2019    CO2 24 04/05/2019    TSH 2.50 10/26/2018    PSA 4.2 12/23/2016    INR 1.03 04/18/2011     Lab Results   Component Value Date    LDLCALC 59 12/07/2018       Glucose   Date/Time Value Ref Range Status   04/05/2019 09:25 AM 99 70 - 125 mg/dL Final   12/07/2018 09:46  70 - 125 mg/dL Final   10/26/2018 08:55  70 - 125 mg/dL Final   07/20/2018 08:57  70 - 125 mg/dL Final   04/13/2018 09:03  70 - 125 mg/dL Final   01/12/2018 08:56  70 - 125 mg/dL Final   10/06/2017 08:33  70 - 125 mg/dL Final   06/30/2017 09:07  70 - 125 mg/dL Final   03/24/2017 08:19  70 - 125 mg/dL Final   12/23/2016 08:48  70 - 125 mg/dL Final     No results found for this or any previous visit (from the past 24 hour(s)).    RADIOLOGY: No results found.

## 2021-05-31 VITALS — WEIGHT: 151 LBS | HEIGHT: 66 IN | BODY MASS INDEX: 24.27 KG/M2

## 2021-05-31 VITALS — WEIGHT: 150.12 LBS | BODY MASS INDEX: 24.12 KG/M2 | HEIGHT: 66 IN

## 2021-05-31 VITALS — HEIGHT: 66 IN | WEIGHT: 154.04 LBS | BODY MASS INDEX: 24.76 KG/M2

## 2021-05-31 NOTE — TELEPHONE ENCOUNTER
----- Message from Augustin Lagunas MD sent at 8/13/2019  9:05 AM CDT -----  Call    Carotid ultrawound looks good

## 2021-05-31 NOTE — TELEPHONE ENCOUNTER
[phone] (home)      [facility] called and LMTCB on generic VM.  Should pt call please inform of provider msg below. Leti Mckeon CMA (AAMA) 9:37 AM

## 2021-05-31 NOTE — TELEPHONE ENCOUNTER
Patient Returning Call  Reason for call:  Results   Information relayed to patient:    ----- Message from Augustin Lagunas MD sent at 8/13/2019  9:05 AM CDT -----  Call     Carotid ultrawound looks good  Patient has additional questions:  No  If YES, what are your questions/concerns:  N/A  Okay to leave a detailed message?: No call back needed

## 2021-06-01 VITALS — BODY MASS INDEX: 24.6 KG/M2 | WEIGHT: 153.04 LBS | HEIGHT: 66 IN

## 2021-06-01 VITALS — BODY MASS INDEX: 23.97 KG/M2 | WEIGHT: 149.12 LBS | HEIGHT: 66 IN

## 2021-06-02 VITALS — BODY MASS INDEX: 22.51 KG/M2 | HEIGHT: 66 IN | WEIGHT: 140.04 LBS

## 2021-06-02 VITALS — WEIGHT: 140 LBS | HEIGHT: 66 IN | BODY MASS INDEX: 22.5 KG/M2

## 2021-06-02 VITALS — WEIGHT: 140 LBS | BODY MASS INDEX: 22.5 KG/M2 | HEIGHT: 66 IN

## 2021-06-02 VITALS — WEIGHT: 142.12 LBS | HEIGHT: 66 IN | BODY MASS INDEX: 22.84 KG/M2

## 2021-06-02 VITALS — BODY MASS INDEX: 22.66 KG/M2 | HEIGHT: 66 IN | WEIGHT: 141 LBS

## 2021-06-02 NOTE — PROGRESS NOTES
OFFICE VISIT NOTE  Ike Cartagena   89 y.o. male            Assessment/Plan for  Ike Cartagena is a 89 y.o. male.  No Patient Care Coordination Note on file.           Overall active doing great.  Caring for his wife 70 years who has dementia    1. IHD (ischemic heart disease)  Stable without angina    2. Moderate aortic stenosis by prior echocardiography  Asymptomatic    3. Essential hypertension  Adequate control    4. Hyperlipidemia, unspecified hyperlipidemia type  On Rx at goal    5. Thrombocytopenia (H)  Resolved         Plan:  Continue current Rx  Echo plan 1 year  Laboratory  Clinic visit 3 months  Discussion-wife- dementia    There are no Patient Instructions on file for this visit.    Diagnoses and all orders for this visit:    IHD (ischemic heart disease)    Moderate aortic stenosis by prior echocardiography    Essential hypertension    Hyperlipidemia, unspecified hyperlipidemia type    Thrombocytopenia (H)        Medications after visit  Current Outpatient Medications   Medication Sig Dispense Refill     ascorbic acid (VITAMIN C) 100 MG tablet Take 1,000 mg by mouth daily.        aspirin 81 MG EC tablet Take 81 mg by mouth daily.       atorvastatin (LIPITOR) 10 MG tablet Take 1 tablet (10 mg total) by mouth daily. 90 tablet 3     finasteride (PROSCAR) 5 mg tablet Take 1 tablet (5 mg total) by mouth daily. 90 tablet 3     metoprolol succinate (TOPROL-XL) 50 MG 24 hr tablet Take 1 tablet (50 mg total) by mouth daily. 90 tablet 3     multivitamin therapeutic (THERAGRAN) tablet Take 1 tablet by mouth daily.       nitroglycerin (NITROSTAT) 0.4 MG SL tablet Place 1 tablet (0.4 mg total) under the tongue every 5 (five) minutes as needed for chest pain. 25 tablet 12     OMEGA-3/DHA/EPA/FISH OIL (FISH OIL-OMEGA-3 FATTY ACIDS) 300-1,000 mg capsule Take 1 g by mouth daily.        omeprazole (PRILOSEC) 20 MG capsule Take 1 capsule (20 mg total) by mouth daily. 90 capsule 3     spironolactone (ALDACTONE) 50 MG  tablet Take 1 tablet (50 mg total) by mouth daily. 90 tablet 3     UBIDECARENONE/VITAMIN E MIXED (COQ10  ORAL) Take 100 mg by mouth daily.       No current facility-administered medications for this visit.                       Augustin Lagunas MD  Internal medicine  [facility]  [phone]  [email or web address]    Much or all of the text in this note was generated through the use of Dragon Dictate voice-to-text software. Errors in spelling or words which seem out of context are unintentional.   Sound alike errors, in particular, may have escaped editing.                 Subjective:   Chief Complaint:  No chief complaint on file.    For follow-up    Active as always  Caring for his wife of 70 years Sari  Notes no physical issues         ISCHEMIC HEART DISEASE- Other than reported, the patient is not having any angina, chest pain, epigastric pain, dyspnea, palpitation, lightheadedness, syncope, excessive fatigue, exertional diaphoresis.-The patient is taking medication as prescribed. No change in nitroglycerin usage    Hypertension-There are no cardiovascular, respiratory, neurologic complaints.No claudication.There is no orthostasis.Patient is compliant with medications.  Medications reviewed.   No side effects from medication.      Mild to moderate left aortic stenosis cardiogram 10/2018  No PND orthopnea dyspnea angina  Hyperlipoproteinemia-patient is tolerating medication.  There are no myalgia, arthralgia, weakness.  No Bowel issues.  Liver profile has been normal.  Patient has met cholesterol goals.      Lab Results   Component Value Date    LDLCALC 67 07/12/2019       Review of Systems:     Extensive 10-point review of systems was performed. Please see the HPI for problem specific pertinent review of systems.     Patient does note singing  Active abdomen  GERD-well controlled   mild BPH symptoms on Proscar    Otherwise, the following systems are noncontributory  including constitutional, eyes, ears, nose and throat, cardiovascular, respiratory, gastrointestinal, genitourinary, musculoskeletal,neurological, skin and/or breast, endocrine, hematologic/lymph, allergic/immunologic and psychiatric.              Medications:  Current Outpatient Medications on File Prior to Visit   Medication Sig     ascorbic acid (VITAMIN C) 100 MG tablet Take 1,000 mg by mouth daily.      aspirin 81 MG EC tablet Take 81 mg by mouth daily.     atorvastatin (LIPITOR) 10 MG tablet Take 1 tablet (10 mg total) by mouth daily.     finasteride (PROSCAR) 5 mg tablet Take 1 tablet (5 mg total) by mouth daily.     metoprolol succinate (TOPROL-XL) 50 MG 24 hr tablet Take 1 tablet (50 mg total) by mouth daily.     multivitamin therapeutic (THERAGRAN) tablet Take 1 tablet by mouth daily.     nitroglycerin (NITROSTAT) 0.4 MG SL tablet Place 1 tablet (0.4 mg total) under the tongue every 5 (five) minutes as needed for chest pain.     OMEGA-3/DHA/EPA/FISH OIL (FISH OIL-OMEGA-3 FATTY ACIDS) 300-1,000 mg capsule Take 1 g by mouth daily.      omeprazole (PRILOSEC) 20 MG capsule Take 1 capsule (20 mg total) by mouth daily.     spironolactone (ALDACTONE) 50 MG tablet Take 1 tablet (50 mg total) by mouth daily.     UBIDECARENONE/VITAMIN E MIXED (COQ10  ORAL) Take 100 mg by mouth daily.     No current facility-administered medications on file prior to visit.             Allergies:No Known Allergies    PSFHx: Tobacco Status:  He  reports that he quit smoking about 51 years ago. He has never used smokeless tobacco.   Alcohol Status:    Social History     Substance and Sexual Activity   Alcohol Use Yes    Comment: occassional       reports that he quit smoking about 51 years ago. He has never used smokeless tobacco. He reports current alcohol use. He reports that he does not use drugs.    Objective:    /80 (Patient Position: Standing)   Weight:   Wt Readings from Last 3 Encounters:   07/12/19 141 lb 1.9 oz (64  "kg)   04/05/19 140 lb 0.6 oz (63.5 kg)   01/03/19 141 lb (64 kg)     BP Readings from Last 10 Encounters:   10/18/19 160/80   07/12/19 128/70   04/05/19 126/60   01/03/19 119/58   12/07/18 138/60   11/05/18 130/68   10/26/18 136/70   07/20/18 138/70   04/13/18 132/70   01/12/18 140/88     Vitals:    10/18/19 0849 10/18/19 0851   BP: 146/80 160/80   Patient Position:  Standing   Pulse: (!) 55    SpO2: 96%    Weight: 144 lb 0.6 oz (65.3 kg)    Height: 5' 6\" (1.676 m)      He is agitated today.  Terrible drive.  Did not take morning meds    General-appears well, no acute distress.  Good color  Pulse regular  Skin: Normal. No rash or lesion  Head:  Normocephalic, symmetric  Speech-clear  Eyes: Eyes midline full EOM.  External exams normal.  No icterus Respiratory:  Normal respiratory effort.  Lungs are clear with good breath sounds.  No dullness.  No wheezing.  Heart: Regular rhythm.  Normal sounding S1, S2 without S3, S4, murmurs, rubs, or gallops.  Extremities-no edema  Gait unremarkable .       Review of clinical lab tests  Lab Results   Component Value Date    WBC 7.0 07/12/2019    HGB 13.7 (L) 07/12/2019    HCT 41.2 07/12/2019     07/12/2019    CHOL 132 07/12/2019    TRIG 62 07/12/2019    HDL 53 07/12/2019    ALT 20 07/12/2019    AST 20 07/12/2019     07/12/2019    K 4.4 07/12/2019     07/12/2019    CREATININE 1.18 07/12/2019    BUN 29 (H) 07/12/2019    CO2 26 07/12/2019    TSH 2.50 10/26/2018    PSA 4.2 12/23/2016    INR 1.03 04/18/2011     Lab Results   Component Value Date    LDLCALC 67 07/12/2019         Glucose   Date/Time Value Ref Range Status   07/12/2019 08:41 AM 98 70 - 125 mg/dL Final   04/05/2019 09:25 AM 99 70 - 125 mg/dL Final   12/07/2018 09:46  70 - 125 mg/dL Final   10/26/2018 08:55  70 - 125 mg/dL Final   07/20/2018 08:57  70 - 125 mg/dL Final   04/13/2018 09:03  70 - 125 mg/dL Final   01/12/2018 08:56  70 - 125 mg/dL Final   10/06/2017 08:33  " 70 - 125 mg/dL Final   06/30/2017 09:07  70 - 125 mg/dL Final   03/24/2017 08:19  70 - 125 mg/dL Final     No results found for this or any previous visit (from the past 24 hour(s)).    RADIOLOGY: No results found.    Review of recent consultation-mil mod as on echo 12/2018

## 2021-06-03 VITALS
DIASTOLIC BLOOD PRESSURE: 80 MMHG | HEIGHT: 66 IN | HEART RATE: 55 BPM | BODY MASS INDEX: 23.15 KG/M2 | SYSTOLIC BLOOD PRESSURE: 160 MMHG | OXYGEN SATURATION: 96 % | WEIGHT: 144.04 LBS

## 2021-06-03 VITALS — WEIGHT: 141.12 LBS | BODY MASS INDEX: 22.68 KG/M2 | HEIGHT: 66 IN

## 2021-06-03 NOTE — TELEPHONE ENCOUNTER
New Appointment Needed  What is the reason for the visit:  Pre op  Pre-Op Appt Request  When is the surgery? :  01-10-20  Where is the surgery?:   [facility]  Who is the surgeon? :  Dr. Hardy  What type of surgery is being done?: Colon/Rectal   Provider Preference: PCP only  How soon do you need to be seen?: any except not Monday Mornings and not anytime on Thursdays  Waitlist offered?: No  Okay to leave a detailed message:  Yes

## 2021-06-03 NOTE — TELEPHONE ENCOUNTER
Spoke with the patient and helped him to schedule an appointment with Dr. Miller on Friday, 12/13/19 at 7:20 am.  Patient verbalized understanding and had no further questions at this time.  Soco EVANS CMA/SANIYA....................9:56 AM

## 2021-06-04 VITALS
DIASTOLIC BLOOD PRESSURE: 58 MMHG | OXYGEN SATURATION: 95 % | SYSTOLIC BLOOD PRESSURE: 124 MMHG | HEART RATE: 64 BPM | BODY MASS INDEX: 23.73 KG/M2 | WEIGHT: 147 LBS

## 2021-06-04 VITALS — WEIGHT: 142 LBS | BODY MASS INDEX: 22.82 KG/M2 | HEIGHT: 66 IN

## 2021-06-04 VITALS
HEIGHT: 66 IN | HEIGHT: 66 IN | WEIGHT: 145 LBS | BODY MASS INDEX: 23.3 KG/M2 | WEIGHT: 145 LBS | BODY MASS INDEX: 23.3 KG/M2

## 2021-06-04 VITALS
HEART RATE: 74 BPM | SYSTOLIC BLOOD PRESSURE: 126 MMHG | HEIGHT: 66 IN | BODY MASS INDEX: 22.82 KG/M2 | OXYGEN SATURATION: 95 % | WEIGHT: 142 LBS | DIASTOLIC BLOOD PRESSURE: 64 MMHG

## 2021-06-04 NOTE — PROGRESS NOTES
Preoperative Exam    Scheduled Procedure: Polop Removal  Surgery Date:  01/10/2020  Surgery Location: [facility], fax [phone]    Surgeon:  Dr. Elmore    Assessment/Plan:     1. Preoperative examination  2. Essential hypertension  3. IHD (ischemic heart disease)  Asymptomatic, independent, active and unremarkable EKG.  Labs from the summer also unremarkable.  Counseled to hold aspirin after 1/5/2020 and to not take Aldactone/spironolactone on the day of surgery.  - Electrocardiogram Perform and Read    Surgical Procedure Risk: Intermediate (reported cardiac risk generally 1-5%)  Have you had prior anesthesia?: Yes  Have you or any family members had a previous anesthesia reaction:  No  Do you or any family members have a history of a clotting or bleeding disorder?: No  Cardiac Risk Assessment: increased risk for major cardiac complications based on  myocardial infarction history    APPROVAL GIVEN to proceed with proposed procedure, without further diagnostic evaluation  Functional Status: Independent  Patient plans to recover at home with family.     Subjective:      Ike Cartagena is a 89 y.o. male who presents for a preoperative consultation. Medications pertinent for aspirin 81 mg Metoprolol, Toprol-XL 50 mg, Aldactone 50 mg.  History pertinent for hypertension, hyperlipidemia, moderate aortic stenosis and ischemic heart disease.  Labs from 10/18/2018 indicate creatinine 1.28 and GFR 53.  TTE from 10/26/2018 showed EF of 60% with normal left ventricular ejection fraction and normal wall thickness. TTE did show mild to moderate AS.   Today, he  denies confusion/delirium after surgeries or hospital stays in the past. Will be picked up by daughters and returned home to his wife Sari. Former . No recent episodes or general problems with bleedings. No recent f/s/c, CP/palpitations, shortness of breath/cough. Endorses chronic rhinorrhea presumed 2/2 allergies. Did get flu shot  this season and UTD on PNA vaccine. NO dysuria, but does endorse chronic urgency due to BPH. Hence the finsteride, which works well. No recent abd pain/diarrhea or history of constipation. Overall feels quite well. Endorses chronic murmur 2/2 rheumatic fever as a child. Likes to keep I shape in his yard and likes to clean his driveway. Does the laundry at home due to his wife being ill.    All other systems reviewed and are negative, other than those listed in the HPI.    Pertinent History  Do you have difficulty breathing or chest pain after walking up a flight of stairs: No  History of obstructive sleep apnea: No  Steroid use in the last 6 months: No  Frequent Aspirin/NSAID use: Yes: 81 mg aspirin  Prior Blood Transfusion: No  Prior Blood Transfusion Reaction: No  If for some reason prior to, during or after the procedure, if it is medically indicated, would you be willing to have a blood transfusion?:  There is no transfusion refusal.    Current Outpatient Medications   Medication Sig Dispense Refill     ascorbic acid (VITAMIN C) 100 MG tablet Take 1,000 mg by mouth daily.        aspirin 81 MG EC tablet Take 81 mg by mouth daily.       atorvastatin (LIPITOR) 10 MG tablet Take 1 tablet (10 mg total) by mouth daily. 90 tablet 3     finasteride (PROSCAR) 5 mg tablet Take 1 tablet (5 mg total) by mouth daily. 90 tablet 3     metoprolol succinate (TOPROL-XL) 50 MG 24 hr tablet Take 1 tablet (50 mg total) by mouth daily. 90 tablet 3     multivitamin therapeutic (THERAGRAN) tablet Take 1 tablet by mouth daily.       OMEGA-3/DHA/EPA/FISH OIL (FISH OIL-OMEGA-3 FATTY ACIDS) 300-1,000 mg capsule Take 1 g by mouth daily.        omeprazole (PRILOSEC) 20 MG capsule Take 1 capsule (20 mg total) by mouth daily. 90 capsule 3     spironolactone (ALDACTONE) 50 MG tablet Take 1 tablet (50 mg total) by mouth daily. 90 tablet 3     UBIDECARENONE/VITAMIN E MIXED (COQ10  ORAL) Take 100 mg by mouth daily.       nitroglycerin  (NITROSTAT) 0.4 MG SL tablet Place 1 tablet (0.4 mg total) under the tongue every 5 (five) minutes as needed for chest pain. 25 tablet 12     No current facility-administered medications for this visit.       No Known Allergies    Patient Active Problem List   Diagnosis     IHD (ischemic heart disease)     HTN (hypertension)     Hyperlipemia     PIN (prostatic intraepithelial neoplasia)     Rectal tumor     Moderate aortic stenosis by prior echocardiography     Thrombocytopenia (H)     Past Surgical History:   Procedure Laterality Date     CATARACT EXTRACTION       colon poly       INGUINAL HERNIORRHAPHY Bilateral      PROSTATE BIOPSY  ,,,     RECTAL TUMOR BY PROCTOTOMY EXCISION       TONSILLECTOMY       TONSILLECTOMY AND ADENOIDECTOMY  193       Smoking status: Former Smoker     Last attempt to quit: 1967     Years since quittin.0     Smokeless tobacco: Never Used   Substance and Sexual Activity     Alcohol use: Yes     Comment: occassional     Drug use: No   Lifestyle   Relationships   Social History Narrative    WIFE VIOLET    RETIRED [employer]    [employer]  [facility]    NATIVE OF [place], [place]    [facility]    4 GRANDCHILDREN    SON ALLY    DAUGHTER ABDIEL DAY                 Patient Care Team:  Augustin Lagunas MD as PCP - General (Internal Medicine)  Augustin Lagunas MD as Assigned PCP    Objective:     Vitals:    19 0709   BP: 124/58   Pulse: 64   SpO2: 95%   Weight: 147 lb (66.7 kg)     Physical Exam:  GENERAL APPEARANCE: Pleasant, nontoxic-appearing, no acute distress   EYES: Eyelids, conjunctiva, and sclera were normal. PERRLA.    HEAD, EARS, NOSE, MOUTH, AND THROAT: Head and face were normal. Hearing was normal to voice.  Oropharynx was normal.  RESPIRATORY: Bilaterally with no crackles, wheezing or rhonchi  CARDIOVASCULAR: Regular S1 and S2.  Radial pulses intact.  No edema.  GASTROINTESTINAL: NABS. Soft. No  organ enlargement or tenderness.  No tenderness, mass, or enlarged organs.   MUSCULOSKELETAL: Skeletal configuration was normal and muscle mass was normal for age. Joint appearance was overall normal.  SKIN/HAIR/NAILS: Skin color was normal.  There were no skin lesions.  Hair and nails were normal.  NEUROLOGIC: Alert and oriented x4. Speech was normal. Cranial nerves were normal. Motor strength was normal for age.  No focal deficits    Patient Instructions   DO NOT the baby 81 mg aspirin after 1/5/2020  DO NOT take the aldactone on the da of the surgery    EKG: Normal sinus rhythm, heart rate 78, , QTc 444 MS, some artifact but no ST/T wave abnormalities.    Labs:  No labs were ordered during this visit    Immunization History   Administered Date(s) Administered     Influenza high dose,seasonal,PF, 65+ yrs 09/23/2016, 10/06/2017, 10/26/2018, 10/18/2019     Pneumo Conj 13-V (2010&after) 01/15/2015     Pneumo Polysac 23-V 10/26/1996, 11/10/2004, 10/13/2009     Td,adult,historic,unspecified 08/11/2000     Tdap 08/26/2011   Electronically signed by Cassius Miller MD 12/13/19 7:33 AM

## 2021-06-05 NOTE — ANESTHESIA POSTPROCEDURE EVALUATION
Patient: Ike Cartagena  EXAM UNDER ANESTHESIA WITH FULGURATION OF RECTAL POLYP  Anesthesia type: general    Patient location: Phase II Recovery  Last vitals:   Vitals Value Taken Time   /68 1/10/2020  2:01 PM   Temp 36.6  C (97.9  F) 1/10/2020  1:49 PM   Pulse 75 1/10/2020  2:20 PM   Resp 16 1/10/2020  1:49 PM   SpO2 95 % 1/10/2020  2:20 PM   Vitals shown include unvalidated device data.  Post vital signs: stable  Level of consciousness: awake and responds to simple questions  Post-anesthesia pain: pain controlled  Post-anesthesia nausea and vomiting: no  Pulmonary: unassisted, return to baseline  Cardiovascular: stable and blood pressure at baseline  Hydration: adequate  Anesthetic events: no    QCDR Measures:  ASA# 11 - Carlyn-op Cardiac Arrest: ASA11B - Patient did NOT experience unanticipated cardiac arrest  ASA# 12 - Carlyn-op Mortality Rate: ASA12B - Patient did NOT die  ASA# 13 - PACU Re-Intubation Rate: ASA13B - Patient did NOT require a new airway mgmt  ASA# 10 - Composite Anes Safety: ASA10A - No serious adverse event    Additional Notes:

## 2021-06-05 NOTE — TELEPHONE ENCOUNTER
FYI - Status Update  Who is Calling: Patient  Update: Patient stated that he wants to inform Augustin Lagunas MD that instead of following up with his doctor at [facility] in 1 year, he will be following up in July 2020 which is 6 months. Patient stated that he doesn't need a call back.  Okay to leave a detailed message?:  No return call needed

## 2021-06-05 NOTE — H&P (VIEW-ONLY)
Preoperative Exam    Scheduled Procedure: Polop Removal  Surgery Date:  01/10/2020  Surgery Location: [facility], fax [phone]    Surgeon:  Dr. Elmore    Assessment/Plan:     1. Preoperative examination  2. Essential hypertension  3. IHD (ischemic heart disease)  Asymptomatic, independent, active and unremarkable EKG.  Labs from the summer also unremarkable.  Counseled to hold aspirin after 1/5/2020 and to not take Aldactone/spironolactone on the day of surgery.  - Electrocardiogram Perform and Read    Surgical Procedure Risk: Intermediate (reported cardiac risk generally 1-5%)  Have you had prior anesthesia?: Yes  Have you or any family members had a previous anesthesia reaction:  No  Do you or any family members have a history of a clotting or bleeding disorder?: No  Cardiac Risk Assessment: increased risk for major cardiac complications based on  myocardial infarction history    APPROVAL GIVEN to proceed with proposed procedure, without further diagnostic evaluation  Functional Status: Independent  Patient plans to recover at home with family.     Subjective:      Ike Cartagena is a 89 y.o. male who presents for a preoperative consultation. Medications pertinent for aspirin 81 mg Metoprolol, Toprol-XL 50 mg, Aldactone 50 mg.  History pertinent for hypertension, hyperlipidemia, moderate aortic stenosis and ischemic heart disease.  Labs from 10/18/2018 indicate creatinine 1.28 and GFR 53.  TTE from 10/26/2018 showed EF of 60% with normal left ventricular ejection fraction and normal wall thickness. TTE did show mild to moderate AS.   Today, he  denies confusion/delirium after surgeries or hospital stays in the past. Will be picked up by daughters and returned home to his wife Sari. Former . No recent episodes or general problems with bleedings. No recent f/s/c, CP/palpitations, shortness of breath/cough. Endorses chronic rhinorrhea presumed 2/2 allergies. Did get flu shot  this season and UTD on PNA vaccine. NO dysuria, but does endorse chronic urgency due to BPH. Hence the finsteride, which works well. No recent abd pain/diarrhea or history of constipation. Overall feels quite well. Endorses chronic murmur 2/2 rheumatic fever as a child. Likes to keep I shape in his yard and likes to clean his driveway. Does the laundry at home due to his wife being ill.    All other systems reviewed and are negative, other than those listed in the HPI.    Pertinent History  Do you have difficulty breathing or chest pain after walking up a flight of stairs: No  History of obstructive sleep apnea: No  Steroid use in the last 6 months: No  Frequent Aspirin/NSAID use: Yes: 81 mg aspirin  Prior Blood Transfusion: No  Prior Blood Transfusion Reaction: No  If for some reason prior to, during or after the procedure, if it is medically indicated, would you be willing to have a blood transfusion?:  There is no transfusion refusal.    Current Outpatient Medications   Medication Sig Dispense Refill     ascorbic acid (VITAMIN C) 100 MG tablet Take 1,000 mg by mouth daily.        aspirin 81 MG EC tablet Take 81 mg by mouth daily.       atorvastatin (LIPITOR) 10 MG tablet Take 1 tablet (10 mg total) by mouth daily. 90 tablet 3     finasteride (PROSCAR) 5 mg tablet Take 1 tablet (5 mg total) by mouth daily. 90 tablet 3     metoprolol succinate (TOPROL-XL) 50 MG 24 hr tablet Take 1 tablet (50 mg total) by mouth daily. 90 tablet 3     multivitamin therapeutic (THERAGRAN) tablet Take 1 tablet by mouth daily.       OMEGA-3/DHA/EPA/FISH OIL (FISH OIL-OMEGA-3 FATTY ACIDS) 300-1,000 mg capsule Take 1 g by mouth daily.        omeprazole (PRILOSEC) 20 MG capsule Take 1 capsule (20 mg total) by mouth daily. 90 capsule 3     spironolactone (ALDACTONE) 50 MG tablet Take 1 tablet (50 mg total) by mouth daily. 90 tablet 3     UBIDECARENONE/VITAMIN E MIXED (COQ10  ORAL) Take 100 mg by mouth daily.       nitroglycerin  (NITROSTAT) 0.4 MG SL tablet Place 1 tablet (0.4 mg total) under the tongue every 5 (five) minutes as needed for chest pain. 25 tablet 12     No current facility-administered medications for this visit.       No Known Allergies    Patient Active Problem List   Diagnosis     IHD (ischemic heart disease)     HTN (hypertension)     Hyperlipemia     PIN (prostatic intraepithelial neoplasia)     Rectal tumor     Moderate aortic stenosis by prior echocardiography     Thrombocytopenia (H)     Past Surgical History:   Procedure Laterality Date     CATARACT EXTRACTION       colon poly       INGUINAL HERNIORRHAPHY Bilateral      PROSTATE BIOPSY  ,,,     RECTAL TUMOR BY PROCTOTOMY EXCISION       TONSILLECTOMY       TONSILLECTOMY AND ADENOIDECTOMY  193       Smoking status: Former Smoker     Last attempt to quit: 1967     Years since quittin.0     Smokeless tobacco: Never Used   Substance and Sexual Activity     Alcohol use: Yes     Comment: occassional     Drug use: No   Lifestyle   Relationships   Social History Narrative    WIFE VIOLET    RETIRED [employer]    [employer] [place] [employer]  [facility]    NATIVE OF [place], [place]    [facility]    4 GRANDCHILDREN    SON ALLY    DAUGHTER ABDIEL DAY                 Patient Care Team:  Augustin Lagunas MD as PCP - General (Internal Medicine)  Augustin Lagunas MD as Assigned PCP    Objective:     Vitals:    19 0709   BP: 124/58   Pulse: 64   SpO2: 95%   Weight: 147 lb (66.7 kg)     Physical Exam:  GENERAL APPEARANCE: Pleasant, nontoxic-appearing, no acute distress   EYES: Eyelids, conjunctiva, and sclera were normal. PERRLA.    HEAD, EARS, NOSE, MOUTH, AND THROAT: Head and face were normal. Hearing was normal to voice.  Oropharynx was normal.  RESPIRATORY: Bilaterally with no crackles, wheezing or rhonchi  CARDIOVASCULAR: Regular S1 and S2.  Radial pulses intact.  No edema.  GASTROINTESTINAL: NABS. Soft. No  organ enlargement or tenderness.  No tenderness, mass, or enlarged organs.   MUSCULOSKELETAL: Skeletal configuration was normal and muscle mass was normal for age. Joint appearance was overall normal.  SKIN/HAIR/NAILS: Skin color was normal.  There were no skin lesions.  Hair and nails were normal.  NEUROLOGIC: Alert and oriented x4. Speech was normal. Cranial nerves were normal. Motor strength was normal for age.  No focal deficits    Patient Instructions   DO NOT the baby 81 mg aspirin after 1/5/2020  DO NOT take the aldactone on the da of the surgery    EKG: Normal sinus rhythm, heart rate 78, , QTc 444 MS, some artifact but no ST/T wave abnormalities.    Labs:  No labs were ordered during this visit    Immunization History   Administered Date(s) Administered     Influenza high dose,seasonal,PF, 65+ yrs 09/23/2016, 10/06/2017, 10/26/2018, 10/18/2019     Pneumo Conj 13-V (2010&after) 01/15/2015     Pneumo Polysac 23-V 10/26/1996, 11/10/2004, 10/13/2009     Td,adult,historic,unspecified 08/11/2000     Tdap 08/26/2011   Electronically signed by Cassius Miller MD 12/13/19 7:33 AM

## 2021-06-05 NOTE — PROGRESS NOTES
OFFICE VISIT NOTE  Ike Cartagena   89 y.o. male            Assessment/Plan for  Ike Cartagena is a 89 y.o. male.  No Patient Care Coordination Note on file.       1. PIN (prostatic intraepithelial neoplasia)     - finasteride (PROSCAR) 5 mg tablet; Take 1 tablet (5 mg total) by mouth daily.  Dispense: 90 tablet; Refill: 3    2. Malignant tumor of rectum (H)  Recently noted by Dr. Moss at all at surgical proctoscopy 1/10/2020 at [facility].  Does have follow-up but not scheduled yet.  Considerations would include continued observation versus radiation which I favor versus surgery.  Discussed with patient.    3. IHD (ischemic heart disease)  Stable without angina  - HM1(CBC and Differential)  - Basic Metabolic Panel  - Hepatic Profile  - HM1 (CBC with Diff)    4. Essential hypertension  Controlled    5. Hyperlipidemia, unspecified hyperlipidemia type  On Rx  - Lipid Cascade    6. Moderate aortic stenosis by prior echocardiography  Clinically unchanged.  Will check echo-possible upcoming surgery  - Echo Complete; Future    7. Bilateral carotid bruits  Asymptomatic-we will re-ultrasound  - US Carotid Bilateral; Future     Plan:  Continue current Rx  Laboratory  Ultrasound carotid  Cardiac echo  Laboratory  Same meds  Discussed options- regarding rectal CA        There are no Patient Instructions on file for this visit.    Diagnoses and all orders for this visit:    Malignant tumor of rectum (H)    PIN (prostatic intraepithelial neoplasia)  -     finasteride (PROSCAR) 5 mg tablet; Take 1 tablet (5 mg total) by mouth daily.  Dispense: 90 tablet; Refill: 3    IHD (ischemic heart disease)  -     HM1(CBC and Differential)  -     Basic Metabolic Panel  -     Hepatic Profile  -     HM1 (CBC with Diff)    Essential hypertension    Hyperlipidemia, unspecified hyperlipidemia type  -     Lipid Cascade    Moderate aortic stenosis by prior echocardiography  -     Echo Complete; Future; Expected date: 01/31/2020    Bilateral  carotid bruits  -     US Carotid Bilateral; Future; Expected date: 01/31/2020        Medications after visit  Current Outpatient Medications   Medication Sig Dispense Refill     ascorbic acid (VITAMIN C) 100 MG tablet Take 1,000 mg by mouth daily.        atorvastatin (LIPITOR) 10 MG tablet Take 1 tablet (10 mg total) by mouth daily. 90 tablet 3     finasteride (PROSCAR) 5 mg tablet Take 1 tablet (5 mg total) by mouth daily. 90 tablet 3     metoprolol succinate (TOPROL-XL) 50 MG 24 hr tablet Take 1 tablet (50 mg total) by mouth daily. 90 tablet 3     multivitamin therapeutic (THERAGRAN) tablet Take 1 tablet by mouth daily.       OMEGA-3/DHA/EPA/FISH OIL (FISH OIL-OMEGA-3 FATTY ACIDS) 300-1,000 mg capsule Take 1 g by mouth daily.        omeprazole (PRILOSEC) 20 MG capsule Take 1 capsule (20 mg total) by mouth daily. 90 capsule 3     spironolactone (ALDACTONE) 50 MG tablet Take 1 tablet (50 mg total) by mouth daily. 90 tablet 3     UBIDECARENONE/VITAMIN E MIXED (COQ10  ORAL) Take 100 mg by mouth daily.       nitroglycerin (NITROSTAT) 0.4 MG SL tablet Place 1 tablet (0.4 mg total) under the tongue every 5 (five) minutes as needed for chest pain. 25 tablet 12     No current facility-administered medications for this visit.                       Augustin Lagunas MD  Internal medicine  [facility]  [phone]  [email or web address]    Much or all of the text in this note was generated through the use of Dragon Dictate voice-to-text software. Errors in spelling or words which seem out of context are unintentional.   Sound alike errors, in particular, may have escaped editing.                 Subjective:   Chief Complaint:  Hypertension and Hyperlipidemia (fasting)    In for follow-up    Recent proctoscopy for rectal tumor revealed adenocarcinoma.  Will be discussing with surgeon further treatment  Patient is in good health but does have ischemic heart disease, moderate aortic  stenosis, carotid bruit.    He has a little bit of loose stool after most recent surgery rectal.    ISCHEMIC HEART DISEASE- Other than reported, the patient is not having any angina, chest pain, epigastric pain, dyspnea, palpitation, lightheadedness, syncope, excessive fatigue, exertional diaphoresis.-The patient is taking medication as prescribed. No change in nitroglycerin usage    Aortic stenosis-moderate on echo.  No PND orthopnea dyspnea angina syncope  Hyperlipoproteinemia-patient is tolerating medication.  There are no myalgia, arthralgia, weakness.  No Bowel issues.  Liver profile has been normal.  Patient has met cholesterol goals.        Hypertension-There are no cardiovascular, respiratory, neurologic complaints.No claudication.There is no orthostasis.Patient is compliant with medications.  Medications reviewed.   No side effects from medication.  Review of Systems:     Extensive 10-point review of systems was performed. Please see the HPI for problem specific pertinent review of systems.     Patient does note he is caring for his wife  She recently fell  Her memory is worse  Talked about home care-he declines at this time    Otherwise, the following systems are noncontributory including constitutional, eyes, ears, nose and throat, cardiovascular, respiratory, gastrointestinal, genitourinary, musculoskeletal,neurological, skin and/or breast, endocrine, hematologic/lymph, allergic/immunologic and psychiatric.              Medications:  Current Outpatient Medications on File Prior to Visit   Medication Sig     ascorbic acid (VITAMIN C) 100 MG tablet Take 1,000 mg by mouth daily.      atorvastatin (LIPITOR) 10 MG tablet Take 1 tablet (10 mg total) by mouth daily.     metoprolol succinate (TOPROL-XL) 50 MG 24 hr tablet Take 1 tablet (50 mg total) by mouth daily.     multivitamin therapeutic (THERAGRAN) tablet Take 1 tablet by mouth daily.     OMEGA-3/DHA/EPA/FISH OIL (FISH OIL-OMEGA-3 FATTY ACIDS) 300-1,000  "mg capsule Take 1 g by mouth daily.      omeprazole (PRILOSEC) 20 MG capsule Take 1 capsule (20 mg total) by mouth daily.     spironolactone (ALDACTONE) 50 MG tablet Take 1 tablet (50 mg total) by mouth daily.     UBIDECARENONE/VITAMIN E MIXED (COQ10  ORAL) Take 100 mg by mouth daily.     [DISCONTINUED] finasteride (PROSCAR) 5 mg tablet Take 1 tablet (5 mg total) by mouth daily.     nitroglycerin (NITROSTAT) 0.4 MG SL tablet Place 1 tablet (0.4 mg total) under the tongue every 5 (five) minutes as needed for chest pain.     No current facility-administered medications on file prior to visit.             Allergies:No Known Allergies    PSFHx: Tobacco Status:  He  reports that he quit smoking about 52 years ago. He has never used smokeless tobacco.   Alcohol Status:    Social History     Substance and Sexual Activity   Alcohol Use Yes     Alcohol/week: 7.0 standard drinks     Types: 7 Standard drinks or equivalent per week       reports that he quit smoking about 52 years ago. He has never used smokeless tobacco. He reports current alcohol use of about 7.0 standard drinks of alcohol per week. He reports that he does not use drugs.    Objective:    /64 (Patient Site: Left Arm, Patient Position: Sitting)   Pulse 74   Ht 5' 6\" (1.676 m)   Wt 142 lb (64.4 kg)   SpO2 95%   BMI 22.92 kg/m    Weight:   Wt Readings from Last 3 Encounters:   01/31/20 142 lb (64.4 kg)   01/03/20 145 lb (65.8 kg)   12/13/19 147 lb (66.7 kg)     BP Readings from Last 10 Encounters:   01/31/20 126/64   01/10/20 146/66   12/13/19 124/58   10/18/19 160/80   07/12/19 128/70   04/05/19 126/60   01/03/19 119/58   12/07/18 138/60   11/05/18 130/68   10/26/18 136/70         General-appears well, no acute distress.  Appears well good color  Pulse regular  Lungs clear  Cardiac 3/6 to 4/6 systolic murmur  long into systole.  Negative AI  Bilateral carotid bruits left greater than right  Abdomen without mass  No edema good pulses  Gait " normal      Review of clinical lab tests  Lab Results   Component Value Date    WBC 7.0 07/12/2019    HGB 13.7 (L) 07/12/2019    HCT 41.2 07/12/2019     07/12/2019    CHOL 132 07/12/2019    TRIG 62 07/12/2019    HDL 53 07/12/2019    ALT 20 07/12/2019    AST 20 07/12/2019     10/18/2019    K 4.4 10/18/2019     10/18/2019    CREATININE 1.28 10/18/2019    BUN 34 (H) 10/18/2019    CO2 26 10/18/2019    TSH 2.50 10/26/2018    PSA 4.2 12/23/2016    INR 1.03 04/18/2011       Glucose   Date/Time Value Ref Range Status   10/18/2019 09:16 AM 98 70 - 125 mg/dL Final   07/12/2019 08:41 AM 98 70 - 125 mg/dL Final   04/05/2019 09:25 AM 99 70 - 125 mg/dL Final   12/07/2018 09:46  70 - 125 mg/dL Final   10/26/2018 08:55  70 - 125 mg/dL Final   07/20/2018 08:57  70 - 125 mg/dL Final   04/13/2018 09:03  70 - 125 mg/dL Final   01/12/2018 08:56  70 - 125 mg/dL Final   10/06/2017 08:33  70 - 125 mg/dL Final   06/30/2017 09:07  70 - 125 mg/dL Final     No results found for this or any previous visit (from the past 24 hour(s)).    RADIOLOGY: No results found.    Review of recent consultation-

## 2021-06-05 NOTE — OP NOTE
COLON AND RECTAL SURGERY OPERATIVE NOTE    DATE OF SERVICE: 1/10/20     PROCEDURE NAME:   1. Rectal exam under anesthesia  2. Fulguration of rectal polyp  3. Transanal excision of rectal polyp     PRE-PROCEDURE DIAGNOSIS: recurrent rectal polyp     POST-PROCEDURE DIAGNOSIS: same     SURGEON: Claudia Elmore MD     ASSISTANT: Augustin Moss MD     FINDINGS: approximately 2cm diameter area of prior fulgurated polyp in right anterior location at the level of the dentate line, additional polyp tissue extending medially from this to the left anterior location, more superior about 3cm from the anal verge     ESTIMATED BLOOD LOSS: 3mL     ANESTHESIA: general     SPECIMEN: Rectal polyp    INDICATIONS FOR PROCEDURE:  Ike Cartagena is a 89 y.o. 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 He returns to the OR today for repeat exam and fulguration of the area. The risks were discussed with the patient which included bleeding, infection, need for further intervention, damage to the sphincter complex and issues related to anesthesia. He wishes to proceed.        DESCRIPTION OF PROCEDURE:  The patient was taken to the operating room and placed under general anesthesia. He was then placed in the prone pauline knife position on the operating room table. The buttocks were taped apart. The surgical area was then prepped and draped in the usual sterile fashion. A timeout was performed per protocol. We then started with injection of local anesthetic for a perianal block. A digital exam was done which revealed this firm area corresponding to the prior fulgurated area in the right anterior location as well as more polyp tissue extending anteriorly over to the left. A bivalve anoscope was inserted and we could see the prior fulgurated area about 2cm in diameter. Rotating anteriorly to the left in  the left anterior location he had what appeared to be another polyp or possibly an extension of this area that was about 1.5 cm in size. This was a bit more superior, about 1 cm above the dentate line.  We started with fulguration of this area in the right anterior location with cautery. We then proceeded to do a transanal excision of this additional polyp that was left anterior. I marked out the borders of the lesion with cautery and then proceeded to do a partial thickness resection of this given its overlying the sphincters. We took care to try and avoid taking any internal sphincter muscle with this. The lesion was removed and sent to pathology. We then closed this defect with a running 3-0 vicryl suture. We irrigated the area and were satisfied with hemostasis.     We removed the anoscope and placed some surgifoam in the anal canal and injected more local. All counts were correct at the end of the procedure. The patient tolerated the procedure well and was awakened from anesthesia without difficulty, and transferred to the recovery room in stable condition.    Claudia Elmore MD

## 2021-06-05 NOTE — TELEPHONE ENCOUNTER
----- Message from Augustin Lagunas MD sent at 2/1/2020  2:54 PM CST -----  Call     Lab excellenty

## 2021-06-05 NOTE — ANESTHESIA PREPROCEDURE EVALUATION
Anesthesia Evaluation      Patient summary reviewed   No history of anesthetic complications     Airway   Mallampati: II  Neck ROM: full   Pulmonary - normal exam    breath sounds clear to auscultation  (+) a smoker (former)                         Cardiovascular   Exercise tolerance: > or = 4 METS  (+) hypertension, valvular problems/murmurs AS, past MI, ,     (-) murmur  ECG reviewed (12/13/19: NSR, 79 bpm)  Rhythm: regular  Rate: normal,    no murmur   ROS comment: Echo 11/5/18:    No previous study for comparison.    Left ventricle ejection fraction is normal. The estimated left ventricular ejection fraction is 60%.    Normal left ventricular size.    Normal right ventricular size and systolic function.    Mild to moderate aortic stenosis with trace aortic insufficiency        Neuro/Psych - negative ROS     Endo/Other - negative ROS      GI/Hepatic/Renal    (+) hiatal hernia, GERD,        Other findings: Labs 10/18/19:  Na 140, K 4.4, BUN 34, Cr 1.28      Dental    (+) implants                       Anesthesia Plan  Planned anesthetic: general endotracheal  Avoid midazolam, scopolamine, diphenhydramine and meperidine 2/2 age and increased risk of postop delirium.  GETA.  Decadron and zofran for PONV ppx.  ASA 2   Induction: intravenous   Anesthetic plan and risks discussed with: patient, spouse and child/children  Anesthesia plan special considerations: antiemetics,   Post-op plan: routine recovery

## 2021-06-05 NOTE — ANESTHESIA CARE TRANSFER NOTE
Last vitals:   Vitals:    01/10/20 1310   BP: 159/70   Pulse: 79   Resp: 14   Temp: 36.8  C (98.2  F)   SpO2: 100%     Patient's level of consciousness is drowsy  Spontaneous respirations: yes  Maintains airway independently: yes  Dentition unchanged: yes  Oropharynx: oropharynx clear of all foreign objects    QCDR Measures:  ASA# 20 - Surgical Safety Checklist: WHO surgical safety checklist completed prior to induction    PQRS# 430 - Adult PONV Prevention: 4558F - Pt received => 2 anti-emetic agents (different classes) preop & intraop  ASA# 8 - Peds PONV Prevention: NA - Not pediatric patient, not GA or 2 or more risk factors NOT present  PQRS# 424 - Carlyn-op Temp Management: 4559F - At least one body temp DOCUMENTED => 35.5C or 95.9F within required timeframe  PQRS# 426 - PACU Transfer Protocol: - Transfer of care checklist used  ASA# 14 - Acute Post-op Pain: ASA14B - Patient did NOT experience pain >= 7 out of 10

## 2021-06-06 NOTE — TELEPHONE ENCOUNTER
----- Message from Augustin Lagunas MD sent at 2/14/2020  7:47 AM CST -----  Please call    Carotid artery ultrasound shows no evidence of any significant blockage  Cardiac echo shows a very strong heart.  Moderate aortic stenosis.  We should repeat his echocardiogram in 18-24 months or before if you have symptoms of lightheadedness, shortness of breath chest pain etc.    Augustin Lagunas MD  Internal medicine  [facility]  [phone]  [email or web address]

## 2021-06-06 NOTE — PROGRESS NOTES
Please call    Carotid artery ultrasound shows no evidence of any significant blockage  Cardiac echo shows a very strong heart.  Moderate aortic stenosis.  We should repeat his echocardiogram in 18-24 months or before if you have symptoms of lightheadedness, shortness of breath chest pain etc.    Augustin Lagunas MD  Internal medicine  [facility]  [phone]  [email or web address]

## 2021-06-06 NOTE — TELEPHONE ENCOUNTER
Refill Approved    Rx renewed per Medication Renewal Policy. Medication was last renewed on 4/5/19.    Dana Walsh, [facility] Triage/Med Refill 3/5/2020     Requested Prescriptions   Pending Prescriptions Disp Refills     atorvastatin (LIPITOR) 10 MG tablet 90 tablet 3     Sig: Take 1 tablet (10 mg total) by mouth daily.       Statins Refill Protocol (Hmg CoA Reductase Inhibitors) Passed - 3/5/2020 10:09 AM        Passed - PCP or prescribing provider visit in past 12 months      Last office visit with prescriber/PCP: 1/31/2020 Augustin Lagunas MD OR same dept: 1/31/2020 Augustin Lagunas MD OR same specialty: 1/31/2020 Augustin Lagunas MD  Last physical: 12/23/2016 Last MTM visit: Visit date not found   Next visit within 3 mo: Visit date not found  Next physical within 3 mo: Visit date not found  Prescriber OR PCP: Augustin Lagunas MD  Last diagnosis associated with med order: 1. IHD (ischemic heart disease)  - atorvastatin (LIPITOR) 10 MG tablet; Take 1 tablet (10 mg total) by mouth daily.  Dispense: 90 tablet; Refill: 3  - metoprolol succinate (TOPROL-XL) 50 MG 24 hr tablet; Take 1 tablet (50 mg total) by mouth daily.  Dispense: 90 tablet; Refill: 3  - omeprazole (PRILOSEC) 20 MG capsule; Take 1 capsule (20 mg total) by mouth.  Dispense: 90 capsule; Refill: 3    2. Essential hypertension  - spironolactone (ALDACTONE) 50 MG tablet; Take 1 tablet (50 mg total) by mouth daily.  Dispense: 90 tablet; Refill: 3    If protocol passes may refill for 12 months if within 3 months of last provider visit (or a total of 15 months).             metoprolol succinate (TOPROL-XL) 50 MG 24 hr tablet 90 tablet 3     Sig: Take 1 tablet (50 mg total) by mouth daily.       Beta-Blockers Refill Protocol Passed - 3/5/2020 10:09 AM        Passed - PCP or prescribing provider visit in past 12 months or next 3 months     Last office visit with prescriber/PCP: 1/31/2020 Augustin Lagunas MD OR same dept: 1/31/2020 Augustin Lagunas MD OR  same specialty: 1/31/2020 Augustin Lagunas MD  Last physical: 12/23/2016 Last MTM visit: Visit date not found   Next visit within 3 mo: Visit date not found  Next physical within 3 mo: Visit date not found  Prescriber OR PCP: Augustin Lagunas MD  Last diagnosis associated with med order: 1. IHD (ischemic heart disease)  - atorvastatin (LIPITOR) 10 MG tablet; Take 1 tablet (10 mg total) by mouth daily.  Dispense: 90 tablet; Refill: 3  - metoprolol succinate (TOPROL-XL) 50 MG 24 hr tablet; Take 1 tablet (50 mg total) by mouth daily.  Dispense: 90 tablet; Refill: 3  - omeprazole (PRILOSEC) 20 MG capsule; Take 1 capsule (20 mg total) by mouth.  Dispense: 90 capsule; Refill: 3    2. Essential hypertension  - spironolactone (ALDACTONE) 50 MG tablet; Take 1 tablet (50 mg total) by mouth daily.  Dispense: 90 tablet; Refill: 3    If protocol passes may refill for 12 months if within 3 months of last provider visit (or a total of 15 months).             Passed - Blood pressure filed in past 12 months     BP Readings from Last 1 Encounters:   02/11/20 128/75             omeprazole (PRILOSEC) 20 MG capsule 90 capsule 3     Sig: Take 1 capsule (20 mg total) by mouth.       GI Medications Refill Protocol Passed - 3/5/2020 10:09 AM        Passed - PCP or prescribing provider visit in last 12 or next 3 months.     Last office visit with prescriber/PCP: 1/31/2020 Augustin Lagunas MD OR same dept: 1/31/2020 Augustin Lagunas MD OR same specialty: 1/31/2020 Augustin Lagunas MD  Last physical: 12/23/2016 Last MTM visit: Visit date not found   Next visit within 3 mo: Visit date not found  Next physical within 3 mo: Visit date not found  Prescriber OR PCP: Augustin Lagunas MD  Last diagnosis associated with med order: 1. IHD (ischemic heart disease)  - atorvastatin (LIPITOR) 10 MG tablet; Take 1 tablet (10 mg total) by mouth daily.  Dispense: 90 tablet; Refill: 3  - metoprolol succinate (TOPROL-XL) 50 MG 24 hr tablet; Take 1 tablet (50 mg  total) by mouth daily.  Dispense: 90 tablet; Refill: 3  - omeprazole (PRILOSEC) 20 MG capsule; Take 1 capsule (20 mg total) by mouth.  Dispense: 90 capsule; Refill: 3    2. Essential hypertension  - spironolactone (ALDACTONE) 50 MG tablet; Take 1 tablet (50 mg total) by mouth daily.  Dispense: 90 tablet; Refill: 3    If protocol passes may refill for 12 months if within 3 months of last provider visit (or a total of 15 months).             spironolactone (ALDACTONE) 50 MG tablet 90 tablet 3     Sig: Take 1 tablet (50 mg total) by mouth daily.       Diuretics/Combination Diuretics Refill Protocol  Passed - 3/5/2020 10:09 AM        Passed - Visit with PCP or prescribing provider visit in past 12 months     Last office visit with prescriber/PCP: 1/31/2020 Augustin Lagunas MD OR same dept: 1/31/2020 Augustin Lagunas MD OR same specialty: 1/31/2020 Augustin Lagunas MD  Last physical: 12/23/2016 Last MTM visit: Visit date not found   Next visit within 3 mo: Visit date not found  Next physical within 3 mo: Visit date not found  Prescriber OR PCP: Augustin Lagunas MD  Last diagnosis associated with med order: 1. IHD (ischemic heart disease)  - atorvastatin (LIPITOR) 10 MG tablet; Take 1 tablet (10 mg total) by mouth daily.  Dispense: 90 tablet; Refill: 3  - metoprolol succinate (TOPROL-XL) 50 MG 24 hr tablet; Take 1 tablet (50 mg total) by mouth daily.  Dispense: 90 tablet; Refill: 3  - omeprazole (PRILOSEC) 20 MG capsule; Take 1 capsule (20 mg total) by mouth.  Dispense: 90 capsule; Refill: 3    2. Essential hypertension  - spironolactone (ALDACTONE) 50 MG tablet; Take 1 tablet (50 mg total) by mouth daily.  Dispense: 90 tablet; Refill: 3    If protocol passes may refill for 12 months if within 3 months of last provider visit (or a total of 15 months).             Passed - Serum Potassium in past 12 months      Lab Results   Component Value Date    Potassium 4.8 01/31/2020             Passed - Serum Sodium in past 12 months       Lab Results   Component Value Date    Sodium 142 01/31/2020             Passed - Blood pressure on file in past 12 months     BP Readings from Last 1 Encounters:   02/11/20 128/75             Passed - Serum Creatinine in past 12 months      Creatinine   Date Value Ref Range Status   01/31/2020 1.17 0.70 - 1.30 mg/dL Final

## 2021-06-08 NOTE — PROGRESS NOTES
"Ike Cartagena is a 89 y.o. male who is being evaluated via a billable telephone visit.      The patient has been notified of following:     \"This telephone visit will be conducted via a call between you and your physician/provider. We have found that certain health care needs can be provided without the need for a physical exam.  This service lets us provide the care you need with a short phone conversation.  If a prescription is necessary we can send it directly to your pharmacy.  If lab work is needed we can place an order for that and you can then stop by our lab to have the test done at a later time.    Telephone visits are billed at different rates depending on your insurance coverage. During this emergency period, for some insurers they may be billed the same as an in-person visit.  Please reach out to your insurance provider with any questions.    If during the course of the call the physician/provider feels a telephone visit is not appropriate, you will not be charged for this service.\"    Patient has given verbal consent to a Telephone visit? Yes    What phone number would you like to be contacted at? [phone]    Patient would like to receive their AVS by AVS Preference: Mail a copy.      Phone call duration:  12 minutes    Florina Irving, [facility]       Telephone VISIT NOTE            Assessment/Plan for  Ike Cartagena is a 89 y.o. male.  No Patient Care Coordination Note on file.       1.  Ischemic heart disease-stable without angina  2.  Asymptomatic aortic stenosis-moderate echocardiographically  3.  Hypertension-controlled  4.  Lipidemia-on Rx  5.  Rectal tumor-followed closely by colorectal.  6.  History of thrombocytopenia.  Last platelet count normal    Plan:  Continue current Rx  Lab- CBC BMP  Dr. Carlos Velez-3 to 4 months    Diagnoses and all orders for this visit:    IHD (ischemic heart disease)    Moderate aortic stenosis by prior echocardiography    Essential " hypertension    Hyperlipidemia, unspecified hyperlipidemia type    Thrombocytopenia (H)    Rectal tumor        Augustin Lagunas MD  Internal medicine  [facility]  [phone]  [email or web address]    This is an electronically verified report by Augusitn Lagunas M.D.  (Note created with Dragon voice recognition and unintended spelling errors and word substitutions may occur)             Subjective:   Chief Complaint:  Follow-up (Final visit)    Overall doing well    No angina    Unchanged urinary issues.  He is on finasteride    No CV CNS complaints.  No angina syncope dyspnea.    Medications:  Current Outpatient Medications on File Prior to Visit   Medication Sig     ascorbic acid (VITAMIN C) 100 MG tablet Take 1,000 mg by mouth daily.      atorvastatin (LIPITOR) 10 MG tablet Take 1 tablet (10 mg total) by mouth daily.     finasteride (PROSCAR) 5 mg tablet Take 1 tablet (5 mg total) by mouth daily.     metoprolol succinate (TOPROL-XL) 50 MG 24 hr tablet Take 1 tablet (50 mg total) by mouth daily.     multivitamin therapeutic (THERAGRAN) tablet Take 1 tablet by mouth daily.     nitroglycerin (NITROSTAT) 0.4 MG SL tablet Place 1 tablet (0.4 mg total) under the tongue every 5 (five) minutes as needed for chest pain.     OMEGA-3/DHA/EPA/FISH OIL (FISH OIL-OMEGA-3 FATTY ACIDS) 300-1,000 mg capsule Take 1 g by mouth daily.      omeprazole (PRILOSEC) 20 MG capsule Take 1 capsule (20 mg total) by mouth daily before breakfast.     spironolactone (ALDACTONE) 50 MG tablet Take 1 tablet (50 mg total) by mouth daily.     UBIDECARENONE/VITAMIN E MIXED (COQ10  ORAL) Take 100 mg by mouth daily.     No current facility-administered medications on file prior to visit.      Allergies:No Known Allergies    Review of Systems:     Extensive 10-point review of systems was performed. Please see the HPI for problem specific pertinent review of systems.     Patient does note his appetite is good  Bowels  okay  He does have follow-up with colorectal  His wife violence condition is stable.  She does require skilled care which he provides.    Otherwise, the following systems are noncontributory including constitutional, eyes, ears, nose and throat, cardiovascular, respiratory, gastrointestinal, genitourinary, musculoskeletal,neurological, skin and/or breast, endocrine, hematologic/lymph, allergic/immunologic and psychiatric.      Objective:    /62 Comment: patient reported  Weight:   Wt Readings from Last 3 Encounters:   02/11/20 142 lb (64.4 kg)   01/31/20 142 lb (64.4 kg)   01/03/20 145 lb (65.8 kg)        Review of clinical lab tests  Lab Results   Component Value Date    WBC 6.3 01/31/2020    HGB 13.3 (L) 01/31/2020    HCT 40.2 01/31/2020     01/31/2020    CHOL 123 01/31/2020    TRIG 65 01/31/2020    HDL 48 01/31/2020    ALT 22 01/31/2020    AST 21 01/31/2020     01/31/2020    K 4.8 01/31/2020     01/31/2020    CREATININE 1.17 01/31/2020    BUN 27 01/31/2020    CO2 24 01/31/2020    TSH 2.50 10/26/2018    PSA 4.2 12/23/2016    INR 1.03 04/18/2011       Glucose   Date/Time Value Ref Range Status   01/31/2020 08:35  70 - 125 mg/dL Final   10/18/2019 09:16 AM 98 70 - 125 mg/dL Final   07/12/2019 08:41 AM 98 70 - 125 mg/dL Final   04/05/2019 09:25 AM 99 70 - 125 mg/dL Final   12/07/2018 09:46  70 - 125 mg/dL Final   10/26/2018 08:55  70 - 125 mg/dL Final   07/20/2018 08:57  70 - 125 mg/dL Final   04/13/2018 09:03  70 - 125 mg/dL Final   01/12/2018 08:56  70 - 125 mg/dL Final   10/06/2017 08:33  70 - 125 mg/dL Final     No results found for this or any previous visit (from the past 24 hour(s)).    No results found.

## 2021-06-09 NOTE — PROGRESS NOTES
OFFICE VISIT NOTE  Ike Cartagena   89 y.o. male      Subjective:   Chief Complaint:  Hypertension and Medication Refill    Celebrated his 89th birthday    Notes his wife is stable mentally    ISCHEMIC HEART DISEASE- Other than reported, the patient is not having any angina, chest pain, epigastric pain, dyspnea, palpitation, lightheadedness, syncope, excessive fatigue, exertional diaphoresis.-The patient is taking medication as prescribed. No change in nitroglycerin usage    Hyperlipoproteinemia-patient is tolerating medication.  There are no myalgia, arthralgia, weakness.  No Bowel issues.  Liver profile has been normal.  Patient has met cholesterol goals.    BPH with obstruction stable symptomatology no side effects from medication.    Review of Systems:     Extensive 10-point review of systems was performed. Please see the HPI for problem specific pertinent review of systems.     Patient does note Aspirin monitoring-taking daily as directed.  No dyspepsia, abdominal pain, no melena, hematochezia.  No bleeding.  No excessive bruising.    Otherwise, the following systems are noncontributory including constitutional, eyes, ears, nose and throat, cardiovascular, respiratory, gastrointestinal, genitourinary, musculoskeletal,neurological, skin and/or breast, endocrine, hematologic/lymph, allergic/immunologic and psychiatric.              Medications:  Current Outpatient Prescriptions   Medication Sig Dispense Refill     ascorbic acid (VITAMIN C) 100 MG tablet Take 100 mg by mouth daily.       aspirin 81 MG EC tablet Take 81 mg by mouth daily.       atorvastatin (LIPITOR) 10 MG tablet Take 1 tablet (10 mg total) by mouth bedtime. 90 tablet 3     finasteride (PROSCAR) 5 mg tablet Take 1 tablet (5 mg total) by mouth daily. 90 tablet 3     metoprolol succinate (TOPROL-XL) 50 MG 24 hr tablet Take 1 tablet (50 mg total) by mouth daily. 90 tablet 3     multivitamin therapeutic (THERAGRAN) tablet Take 1 tablet by mouth daily.        OMEGA-3/DHA/EPA/FISH OIL (FISH OIL-OMEGA-3 FATTY ACIDS) 300-1,000 mg capsule Take 2 g by mouth daily.       omeprazole (PRILOSEC) 20 MG capsule Take 1 capsule (20 mg total) by mouth daily. 90 capsule 3     spironolactone (ALDACTONE) 50 MG tablet Take 1 tablet (50 mg total) by mouth daily. 90 tablet 3     UBIDECARENONE/VITAMIN E MIXED (COQ10  ORAL) Take 100 mg by mouth daily.       nitroglycerin (NITROSTAT) 0.4 MG SL tablet Place 1 tablet (0.4 mg total) under the tongue every 5 (five) minutes as needed for chest pain. 100 tablet 3     No current facility-administered medications for this visit.      Current Outpatient Prescriptions on File Prior to Visit   Medication Sig     ascorbic acid (VITAMIN C) 100 MG tablet Take 100 mg by mouth daily.     aspirin 81 MG EC tablet Take 81 mg by mouth daily.     finasteride (PROSCAR) 5 mg tablet Take 1 tablet (5 mg total) by mouth daily.     multivitamin therapeutic (THERAGRAN) tablet Take 1 tablet by mouth daily.     OMEGA-3/DHA/EPA/FISH OIL (FISH OIL-OMEGA-3 FATTY ACIDS) 300-1,000 mg capsule Take 2 g by mouth daily.     UBIDECARENONE/VITAMIN E MIXED (COQ10  ORAL) Take 100 mg by mouth daily.     [DISCONTINUED] atorvastatin (LIPITOR) 10 MG tablet Take 1 tablet (10 mg total) by mouth bedtime.     [DISCONTINUED] metoprolol succinate (TOPROL-XL) 50 MG 24 hr tablet Take 1 tablet (50 mg total) by mouth daily.     [DISCONTINUED] omeprazole (PRILOSEC) 20 MG capsule Take 1 capsule (20 mg total) by mouth daily.     [DISCONTINUED] spironolactone (ALDACTONE) 50 MG tablet Take 1 tablet (50 mg total) by mouth daily.     No current facility-administered medications on file prior to visit.        Allergies:No Known Allergies    PSFHx: Tobacco Status:  He  reports that he quit smoking about 49 years ago. He quit smokeless tobacco use about 50 years ago.   Alcohol Status:    History   Alcohol Use     Yes       reports that he quit smoking about 49 years ago. He quit smokeless  "tobacco use about 50 years ago. He reports that he drinks alcohol. His drug history is not on file.    Objective:    /70 (Patient Site: Right Arm, Patient Position: Standing)  Pulse 78  Ht 5' 5.75\" (1.67 m)  Wt 153 lb (69.4 kg)  SpO2 96%  BMI 24.88 kg/m2  Weight:   Wt Readings from Last 3 Encounters:   03/24/17 153 lb (69.4 kg)   12/23/16 152 lb 0.6 oz (69 kg)   09/23/16 150 lb 1.3 oz (68.1 kg)     BP Readings from Last 3 Encounters:   03/24/17 138/70   12/23/16 114/62   09/23/16 112/70         General-appears well, no acute distress.  Skin: Normal. No rash or lesion  Head:  Normocephalic, symmetric  Speech-clear  Eyes: Eyes midline full EOM.  External exams normal.  No icterus  Neck:  No palpable masses, lymphadenopathy or tenderness. No thyromegaly or goiter  Carotid Arteries:  Equal pulsations bilateral.  Soft bilateral systolic bruit, normal upstroke  Chest Wall: No deformity or pain elicited on compression.  Respiratory:  Normal respiratory effort.  Lungs are clear with good breath sounds.  No dullness.  No wheezing.  Heart: Regular rhythm.  Normal sounding S1, S2 without S3, S4, murmurs, rubs, or gallops.  Extremities-good pulses no edema  Neurologic gait normal.         Review of clinical lab tests  Lab Results   Component Value Date    WBC 7.4 12/23/2016    HGB 14.8 12/23/2016    HCT 45.6 12/23/2016     12/23/2016    CHOL 137 12/23/2016    TRIG 85 12/23/2016    HDL 49 12/23/2016    ALT 26 12/23/2016    AST 22 12/23/2016     12/23/2016    K 4.9 12/23/2016     12/23/2016    CREATININE 1.19 12/23/2016    BUN 34 (H) 12/23/2016    CO2 22 12/23/2016    PSA 4.2 12/23/2016    INR 1.03 04/18/2011     Lab Results   Component Value Date    CHOL 137 12/23/2016    CHOL 133 06/10/2016    CHOL 125 03/08/2016     Lab Results   Component Value Date    HDL 49 12/23/2016    HDL 49 06/10/2016    HDL 43 03/08/2016     Lab Results   Component Value Date    LDLCALC 71 12/23/2016    LDLCALC 65 " 06/10/2016    LDLCALC 62 03/08/2016     Lab Results   Component Value Date    TRIG 85 12/23/2016    TRIG 95 06/10/2016    TRIG 99 03/08/2016     No components found for: CHOLHDL      No results found for this or any previous visit (from the past 24 hour(s)).    RADIOLOGY: No results found.    Review of recent consultation-         Assessment/Plan for  Ike Cartagena is a 89 y.o. male.  No Patient Care Coordination Note on file.       1. IHD (ischemic heart disease)  Stable  - omeprazole (PRILOSEC) 20 MG capsule; Take 1 capsule (20 mg total) by mouth daily.  Dispense: 90 capsule; Refill: 3  - metoprolol succinate (TOPROL-XL) 50 MG 24 hr tablet; Take 1 tablet (50 mg total) by mouth daily.  Dispense: 90 tablet; Refill: 3  - atorvastatin (LIPITOR) 10 MG tablet; Take 1 tablet (10 mg total) by mouth bedtime.  Dispense: 90 tablet; Refill: 3    2. Essential hypertension  Adequately controlled  - spironolactone (ALDACTONE) 50 MG tablet; Take 1 tablet (50 mg total) by mouth daily.  Dispense: 90 tablet; Refill: 3    3. Disorder of prostate   On chronic Rx doing well    4.  Carotid bruit bilateral with mild disease noted on ultrasounds in past-2011.  Asymptomatic.  On lipid therapy, antiplatelet.  Consider rechecking another ultrasound       Plan:  Continue current medication  Clinic visit 3 months  Laboratory      Diagnoses and all orders for this visit:    Disorder of prostate     IHD (ischemic heart disease)  -     omeprazole (PRILOSEC) 20 MG capsule; Take 1 capsule (20 mg total) by mouth daily.  Dispense: 90 capsule; Refill: 3  -     metoprolol succinate (TOPROL-XL) 50 MG 24 hr tablet; Take 1 tablet (50 mg total) by mouth daily.  Dispense: 90 tablet; Refill: 3  -     atorvastatin (LIPITOR) 10 MG tablet; Take 1 tablet (10 mg total) by mouth bedtime.  Dispense: 90 tablet; Refill: 3  -     nitroglycerin (NITROSTAT) 0.4 MG SL tablet; Place 1 tablet (0.4 mg total) under the tongue every 5 (five) minutes as needed for chest  pain.  Dispense: 100 tablet; Refill: 3  -     HM1(CBC and Differential)  -     HM1 (CBC with Diff)    Essential hypertension  -     spironolactone (ALDACTONE) 50 MG tablet; Take 1 tablet (50 mg total) by mouth daily.  Dispense: 90 tablet; Refill: 3  -     Basic Metabolic Panel                Augustin Lagunas MD  Internal medicine  [facility]  [phone]  [email or web address]      This is an electronically verified report by Augustin Lagunas M.D.  (Note created with Dragon voice recognition and unintended spelling errors and word substitutions may occur)

## 2021-06-11 NOTE — PROGRESS NOTES
OFFICE VISIT NOTE  Ike Cartagena   89 y.o. male            Assessment/Plan for  Ike Cartagena is a 89 y.o. male.  No Patient Care Coordination Note on file.       There are no diagnoses linked to this encounter.   1.  Ischemic heart disease stable without angina  2.  Hyperlipidemia-on treatment  3.  Hypertension-controlled  4.  BPH-on Proscar  5.  Rectal tumor-followed by closely by colorectal    Plan:  Continue same medication  Routine lab  Clinic visit 3 months  Discussion- wife-memory changes    There are no Patient Instructions on file for this visit.    Current Outpatient Prescriptions   Medication Sig Dispense Refill     ascorbic acid (VITAMIN C) 100 MG tablet Take 1,000 mg by mouth daily.        aspirin 81 MG EC tablet Take 81 mg by mouth daily.       atorvastatin (LIPITOR) 10 MG tablet Take 1 tablet (10 mg total) by mouth bedtime. 90 tablet 3     finasteride (PROSCAR) 5 mg tablet Take 1 tablet (5 mg total) by mouth daily. 90 tablet 3     metoprolol succinate (TOPROL-XL) 50 MG 24 hr tablet Take 1 tablet (50 mg total) by mouth daily. 90 tablet 3     multivitamin therapeutic (THERAGRAN) tablet Take 1 tablet by mouth daily.       nitroglycerin (NITROSTAT) 0.4 MG SL tablet Place 1 tablet (0.4 mg total) under the tongue every 5 (five) minutes as needed for chest pain. 100 tablet 3     OMEGA-3/DHA/EPA/FISH OIL (FISH OIL-OMEGA-3 FATTY ACIDS) 300-1,000 mg capsule Take 1 g by mouth daily.        omeprazole (PRILOSEC) 20 MG capsule Take 1 capsule (20 mg total) by mouth daily. 90 capsule 3     spironolactone (ALDACTONE) 50 MG tablet Take 1 tablet (50 mg total) by mouth daily. 90 tablet 3     UBIDECARENONE/VITAMIN E MIXED (COQ10  ORAL) Take 100 mg by mouth daily.       No current facility-administered medications for this visit.          Diagnoses and all orders for this visit:    IHD (ischemic heart disease)  -     HM1(CBC and Differential)  -     HM1 (CBC with Diff)    Essential hypertension  -     Basic  Metabolic Panel    Hyperlipemia  -     Hepatic Profile  -     Lipid Cascade    Rectal tumor              Augustin Lagunas MD  Internal medicine  [facility]  [phone]  [email or web address]      This is an electronically verified report by Augustin Lagunas M.D.  (Note created with Dragon voice recognition and unintended spelling errors and word substitutions may occur)               Subjective:   Chief Complaint:  Hypertension (Fasting) and Follow-up    In for follow-up chronic medical illnesses    ISCHEMIC HEART DISEASE- Other than reported, the patient is not having any angina, chest pain, epigastric pain, dyspnea, palpitation, lightheadedness, syncope, excessive fatigue, exertional diaphoresis.-The patient is taking medication as prescribed. No change in nitroglycerin usage    Hypertension-There are no cardiovascular, respiratory, neurologic complaints.No claudication.There is no orthostasis.Patient is compliant with medications.  Medications reviewed.   No side effects from medication.    Hyperlipoproteinemia-patient is tolerating medication.  There are no myalgia, arthralgia, weakness.  No Bowel issues.  Liver profile has been normal.  Patient has met cholesterol goals.  Lab Results   Component Value Date    LDLCALC 71 12/23/2016         Review of Systems:     Extensive 10-point review of systems was performed. Please see the HPI for problem specific pertinent review of systems.     Patient does note situational anxiety.  Concern regarding his wife.  She has been quite stable however    Otherwise, the following systems are noncontributory including constitutional, eyes, ears, nose and throat, cardiovascular, respiratory, gastrointestinal, genitourinary, musculoskeletal,neurological, skin and/or breast, endocrine, hematologic/lymph, allergic/immunologic and psychiatric.              Medications:  Current Outpatient Prescriptions on File Prior to Visit   Medication Sig      "ascorbic acid (VITAMIN C) 100 MG tablet Take 1,000 mg by mouth daily.      aspirin 81 MG EC tablet Take 81 mg by mouth daily.     atorvastatin (LIPITOR) 10 MG tablet Take 1 tablet (10 mg total) by mouth bedtime.     finasteride (PROSCAR) 5 mg tablet Take 1 tablet (5 mg total) by mouth daily.     metoprolol succinate (TOPROL-XL) 50 MG 24 hr tablet Take 1 tablet (50 mg total) by mouth daily.     multivitamin therapeutic (THERAGRAN) tablet Take 1 tablet by mouth daily.     nitroglycerin (NITROSTAT) 0.4 MG SL tablet Place 1 tablet (0.4 mg total) under the tongue every 5 (five) minutes as needed for chest pain.     OMEGA-3/DHA/EPA/FISH OIL (FISH OIL-OMEGA-3 FATTY ACIDS) 300-1,000 mg capsule Take 1 g by mouth daily.      omeprazole (PRILOSEC) 20 MG capsule Take 1 capsule (20 mg total) by mouth daily.     spironolactone (ALDACTONE) 50 MG tablet Take 1 tablet (50 mg total) by mouth daily.     UBIDECARENONE/VITAMIN E MIXED (COQ10  ORAL) Take 100 mg by mouth daily.     No current facility-administered medications on file prior to visit.             Allergies:No Known Allergies    PSFHx: Tobacco Status:  He  reports that he quit smoking about 49 years ago. He quit smokeless tobacco use about 50 years ago.   Alcohol Status:    History   Alcohol Use     Yes       reports that he quit smoking about 49 years ago. He quit smokeless tobacco use about 50 years ago. He reports that he drinks alcohol. His drug history is not on file.    Objective:    /64 (Patient Site: Right Arm, Patient Position: Sitting, Cuff Size: Adult Regular)  Pulse 68  Ht 5' 5.75\" (1.67 m)  Wt 151 lb (68.5 kg)  SpO2 97%  BMI 24.56 kg/m2  Weight:   Wt Readings from Last 3 Encounters:   06/30/17 151 lb (68.5 kg)   03/24/17 153 lb (69.4 kg)   12/23/16 152 lb 0.6 oz (69 kg)     BP Readings from Last 3 Encounters:   06/30/17 112/64   03/24/17 138/70   12/23/16 114/62         General-appears well, no acute distress.  Skin: Normal. No rash or " lesion  Head:  Normocephalic, symmetric  Speech-clear  Eyes: Eyes midline full EOM.  External exams normal.  No icterus  Neck:  No palpable masses, lymphadenopathy or tenderness. No thyromegaly or goiter  Carotid Arteries:  Equal pulsations bilateral.No Bruit, normal upstroke  Chest Wall: No deformity or pain elicited on compression.  Respiratory:  Normal respiratory effort.  Lungs are clear with good breath sounds.  No dullness.  No wheezing.  Heart: Regular rhythm.  Normal sounding S1, S2 without S3, S4, murmurs, rubs, or gallops.  Extremities-no edema excellent pulses  Benign abdomen no aneurysm organomegaly.         Review of clinical lab tests  Lab Results   Component Value Date    WBC 7.2 03/24/2017    HGB 14.4 03/24/2017    HCT 43.7 03/24/2017     03/24/2017    CHOL 137 12/23/2016    TRIG 85 12/23/2016    HDL 49 12/23/2016    ALT 26 12/23/2016    AST 22 12/23/2016     03/24/2017    K 4.7 03/24/2017     03/24/2017    CREATININE 1.23 03/24/2017    BUN 35 (H) 03/24/2017    CO2 25 03/24/2017    PSA 4.2 12/23/2016    INR 1.03 04/18/2011     Lab Results   Component Value Date    LDLCALC 71 12/23/2016         Glucose   Date/Time Value Ref Range Status   03/24/2017 08:19  70 - 125 mg/dL Final   12/23/2016 08:48  70 - 125 mg/dL Final   06/10/2016 10:11  70 - 125 mg/dL Final   01/06/2016 02:17 PM 99 70 - 125 mg/dL Final   12/01/2015 09:03  74 - 125 mg/dL Final     No results found for this or any previous visit (from the past 24 hour(s)).    RADIOLOGY: No results found.    Review of recent consultation-   Dr. Moss proctologist.  Every 3 months no recurrence

## 2021-06-13 NOTE — PROGRESS NOTES
"Ike Cartagena is a 89 y.o. male who is being evaluated via a billable telephone visit.      The patient has been notified of following:     \"This telephone visit will be conducted via a call between you and your physician/provider. We have found that certain health care needs can be provided without the need for a physical exam.  This service lets us provide the care you need with a short phone conversation.  If a prescription is necessary we can send it directly to your pharmacy.  If lab work is needed we can place an order for that and you can then stop by our lab to have the test done at a later time.    Telephone visits are billed at different rates depending on your insurance coverage. During this emergency period, for some insurers they may be billed the same as an in-person visit.  Please reach out to your insurance provider with any questions.    If during the course of the call the physician/provider feels a telephone visit is not appropriate, you will not be charged for this service.\"    Patient has given verbal consent to a Telephone visit? Yes    What phone number would you like to be contacted at? [phone]    Patient would like to receive their AVS by AVS Preference: Mail a copy.     Additional provider notes:   Subjective: Hospitalized from 11/18/2020-11/23/2020 for septic shock due to E. coli UTI/bacteremia.  Hx of hypertension, hyperlipidemia, moderate aortic stenosis and ischemic heart disease.  Noted to have perinephric stranding on CT demand and BRYNN with creatinine up to 1.95.  Treated with pressors, broad-spectrum antibiotics.  Course complicated by hypoxic respiratory failure.  Discharged on 10 days of cefdinir.  Received flu vaccine on 2020-11-21.   Wants to talk about constipation today. Notes that he does not go as normally as he used to. This started after his return to the hospital. Was having 1 BM a day, but now it is more difficult. Also notes episodes of ?incontinence of " stool/soiling himself without knowing it. The stool will gradually ooze out. This is not in the setting of taking a laxative/stool softener. Has been taking metamucil occasionally and it is not helping much. No nausea, abd pain, f/s/c, back/chest pain, blood stool. No hard pellets, but instead more normal consistency. No change in his diet. Does not drink lots of caffeine or white bread. Is empyting his bladder well. Is on spironolactone and wonders whether he should be it. He does have hemorrhoids but denies any pain or bleeding from them right on    Hospital Follow-up Visit:    Hospital/Nursing Home/IP Rehab Facility:Admitted to [facility]  11/18/20  Date of Discharge: 11/28/20  Reason(s) for Admission: sepsis     Was your hospitalization related to COVID-19? No  Problems taking medications regularly:  None  Medication changes since discharge: None  Problems adhering to non-medication therapy:  None    Summary of hospitalization:  [facility] hospital discharge summary reviewed  Diagnostic Tests/Treatments reviewed.  Follow up needed: none  Other Healthcare Providers Involved in Patient s Care:         None  Update since discharge: improved.      Post Discharge Medication Reconciliation: discharge medications reconciled, continue medications without change.  Plan of care communicated with patient            Assessment/Plan:  1. PIN (prostatic intraepithelial neoplasia)  - finasteride (PROSCAR) 5 mg tablet; Take 1 tablet (5 mg total) by mouth daily.  Dispense: 90 tablet; Refill: 3    2. Essential hypertension  Very well controlled.  Goal systolic less than 130-140 mmHg.  Heart rate well controlled.  Counseled patient to use his wife's blood pressure machine to check his blood pressure 3 days a week and to return the values/numbers to us next week.  At that time we can decide whether to decrease the spironolactone dose to help decrease risk of dehydration and electrolyte abnormalities.    3. Hospital discharge  follow-up  5. Thrombocytopenia (H)  Doing well overall.  Did have a case of thrombocytopenia but he deferred having his labs checked until later in 2021 due to the pandemic and trying to limit his outdoor exposure.  Otherwise renal function, electrolytes magnesium 1.7 and CBC otherwise unremarkable.  Counseled to let us know if he experiences any bleeding or any other new symptoms.    4. Constipation, unspecified constipation type  Unsure of cause but he does endorse a history of hemorrhoids.  Does not appear to be secondary to any medications but with spironolactone he is at risk for dehydration.  Counseled to please take 2-3 prunes per day and stay well-hydrated with 64-80 ounces of water.  Will let us know how he is tolerating it and can titrate the use of his prunes to more or less depending on his goal frequent bowel movements a day.     Phone call duration: 14 minutes    Post Discharge Medication Reconciliation Status: discharge medications reconciled, continue medications without change      Dory Handley, CMA

## 2021-06-13 NOTE — PROGRESS NOTES
OFFICE VISIT NOTE  Ike Cartagena   89 y.o. male            Assessment/Plan for  Ike Cartagena is a 89 y.o. male.  No Patient Care Coordination Note on file.             1.  Situational anxiety-worried about wife progressive loss.  She is frail.  She is followed.  Discussed.  Discussed home health care referral.  Declines.  2.  Ischemic heart disease-stable  3.  Hypertension-controlled.  Some orthostasis  4.  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  Pin/BPH on Proscar.  Asymptomatic continue current medication.  Recommend    Plan:  Safety/wound care eval-declines  Same medicines  Lack of  3 months    There are no Patient Instructions on file for this visit.    Diagnoses and all orders for this visit:    Influenza vaccine administered  -     Influenza High Dose, Seasonal 65+ yrs    IHD (ischemic heart disease)  -     HM1(CBC and Differential)  -     Basic Metabolic Panel  -     HM1 (CBC with Diff)    Essential hypertension    Disorder of prostate     Hyperlipemia  -     Hepatic Profile  -     Lipid Cascade        Medications after visit  Current Outpatient Prescriptions   Medication Sig Dispense Refill     ascorbic acid (VITAMIN C) 100 MG tablet Take 1,000 mg by mouth daily.        aspirin 81 MG EC tablet Take 81 mg by mouth daily.       atorvastatin (LIPITOR) 10 MG tablet Take 1 tablet (10 mg total) by mouth bedtime. 90 tablet 3     finasteride (PROSCAR) 5 mg tablet Take 1 tablet (5 mg total) by mouth daily. 90 tablet 3     metoprolol succinate (TOPROL-XL) 50 MG 24 hr tablet Take 1 tablet (50 mg total) by mouth daily. 90 tablet 3     multivitamin therapeutic (THERAGRAN) tablet Take 1 tablet by mouth daily.       nitroglycerin (NITROSTAT) 0.4 MG SL tablet Place 1 tablet (0.4 mg total) under the tongue every 5 (five) minutes as needed for chest pain. 100 tablet 3     OMEGA-3/DHA/EPA/FISH OIL (FISH OIL-OMEGA-3 FATTY ACIDS) 300-1,000 mg capsule Take 1 g by mouth daily.        omeprazole (PRILOSEC) 20 MG capsule Take 1 capsule  (20 mg total) by mouth daily. 90 capsule 3     spironolactone (ALDACTONE) 50 MG tablet Take 1 tablet (50 mg total) by mouth daily. 90 tablet 3     UBIDECARENONE/VITAMIN E MIXED (COQ10  ORAL) Take 100 mg by mouth daily.       No current facility-administered medications for this visit.                       Augustin Lagunas MD  Internal medicine  [facility]  [phone]  [email or web address]      This is an electronically verified report by Augustin Lagunas M.D.  (Note created with Dragon voice recognition and unintended spelling errors and word substitutions may occur)               Subjective:   Chief Complaint:  Hypertension; Follow-up; and Flu Vaccine    Follow-up chronic medical illnesses    Sari fell last night.  In the tub.  She is increasingly frail.  She is not eating.  She has chronic kidney disease.  Fortunately she was not injured.  There are no safety bars.  She is not in the shower but in the top.  He does not observe her take her medication.  I think he could use some help.  He does decline this.    No angina.  Active.  ISCHEMIC HEART DISEASE- Other than reported, the patient is not having any angina, chest pain, epigastric pain, dyspnea, palpitation, lightheadedness, syncope, excessive fatigue, exertional diaphoresis.-The patient is taking medication as prescribed. No change in nitroglycerin usage    Insert hypertension    Hyperlipoproteinemia-patient is tolerating medication.  There are no myalgia, arthralgia, weakness.  No Bowel issues.  Liver profile has been normal.  Patient has met cholesterol goals.  Lab Results   Component Value Date    LDLCALC 67 06/30/2017         Review of Systems:     Extensive 10-point review of systems was performed. Please see the HPI for problem specific pertinent review of systems.     Patient does note continues to same inquire  Bowels normal  Urination okay    Otherwise, the following systems are noncontributory including  "constitutional, eyes, ears, nose and throat, cardiovascular, respiratory, gastrointestinal, genitourinary, musculoskeletal,neurological, skin and/or breast, endocrine, hematologic/lymph, allergic/immunologic and psychiatric.              Medications:  Current Outpatient Prescriptions on File Prior to Visit   Medication Sig     ascorbic acid (VITAMIN C) 100 MG tablet Take 1,000 mg by mouth daily.      aspirin 81 MG EC tablet Take 81 mg by mouth daily.     atorvastatin (LIPITOR) 10 MG tablet Take 1 tablet (10 mg total) by mouth bedtime.     finasteride (PROSCAR) 5 mg tablet Take 1 tablet (5 mg total) by mouth daily.     metoprolol succinate (TOPROL-XL) 50 MG 24 hr tablet Take 1 tablet (50 mg total) by mouth daily.     multivitamin therapeutic (THERAGRAN) tablet Take 1 tablet by mouth daily.     nitroglycerin (NITROSTAT) 0.4 MG SL tablet Place 1 tablet (0.4 mg total) under the tongue every 5 (five) minutes as needed for chest pain.     OMEGA-3/DHA/EPA/FISH OIL (FISH OIL-OMEGA-3 FATTY ACIDS) 300-1,000 mg capsule Take 1 g by mouth daily.      omeprazole (PRILOSEC) 20 MG capsule Take 1 capsule (20 mg total) by mouth daily.     spironolactone (ALDACTONE) 50 MG tablet Take 1 tablet (50 mg total) by mouth daily.     UBIDECARENONE/VITAMIN E MIXED (COQ10  ORAL) Take 100 mg by mouth daily.     No current facility-administered medications on file prior to visit.             Allergies:No Known Allergies    PSFHx: Tobacco Status:  He  reports that he quit smoking about 49 years ago. He quit smokeless tobacco use about 50 years ago.   Alcohol Status:    History   Alcohol Use     Yes       reports that he quit smoking about 49 years ago. He quit smokeless tobacco use about 50 years ago. He reports that he drinks alcohol. His drug history is not on file.    Objective:    /78 (Patient Site: Left Arm, Patient Position: Standing)  Pulse 84  Ht 5' 5.75\" (1.67 m)  Wt 150 lb 1.9 oz (68.1 kg)  SpO2 94%  BMI 24.41 " kg/m2  Weight:   Wt Readings from Last 3 Encounters:   10/06/17 150 lb 1.9 oz (68.1 kg)   06/30/17 151 lb (68.5 kg)   03/24/17 153 lb (69.4 kg)     BP Readings from Last 3 Encounters:   10/06/17 122/78   06/30/17 112/64   03/24/17 138/70     Orthostasis noted 20 points  Slight kyphosis  General-appears well, no acute distress.  Bright alert  Skin: Normal. No rash or lesion  Head:  Normocephalic, symmetric  Speech-clear  Eyes: Eyes midline full EOM.  External exams normal.  No icterus  Neck:  No palpable masses, lymphadenopathy or tenderness. No thyromegaly or goiter  Carotid Arteries:  Equal pulsations bilateral.No Bruit, normal upstroke  Chest Wall: No deformity or pain elicited on compression.  Respiratory:  Normal respiratory effort.  Lungs are clear with good breath sounds.  No dullness.  No wheezing.  Heart: Regular rhythm.  Normal sounding S1, S2 without S3, S4, murmurs, rubs, or gallops.  Extremities-no edema good pulses.         Review of clinical lab tests  Lab Results   Component Value Date    WBC 6.5 06/30/2017    HGB 14.6 06/30/2017    HCT 43.5 06/30/2017     (L) 06/30/2017    CHOL 128 06/30/2017    TRIG 79 06/30/2017    HDL 45 06/30/2017    ALT 22 06/30/2017    AST 23 06/30/2017     06/30/2017    K 4.3 06/30/2017     06/30/2017    CREATININE 1.22 06/30/2017    BUN 27 06/30/2017    CO2 24 06/30/2017    PSA 4.2 12/23/2016    INR 1.03 04/18/2011       Glucose   Date/Time Value Ref Range Status   06/30/2017 09:07  70 - 125 mg/dL Final   03/24/2017 08:19  70 - 125 mg/dL Final   12/23/2016 08:48  70 - 125 mg/dL Final   06/10/2016 10:11  70 - 125 mg/dL Final   01/06/2016 02:17 PM 99 70 - 125 mg/dL Final   12/01/2015 09:03  74 - 125 mg/dL Final     No results found for this or any previous visit (from the past 24 hour(s)).    RADIOLOGY: No results found.    Review of recent consultation-

## 2021-06-14 NOTE — PROGRESS NOTES
Clinic Care Coordination Contact  Community Health Worker Initial Outreach    CHW Initial Information Gathering:  Referral Source: Other, specify  Preferred Hospital: [facility]  [phone]  Current living arrangement:: I live alone  Type of residence:: Private home - stairs  Community Resources: None  Supplies Currently Used at Home: None  Equipment Currently Used at Home: none  Informal Support system:: Children  No PCP office visit in Past Year: Yes  Transportation means:: Family       Patient accepts CC: Yes. Patient scheduled for assessment with CCC RN on 1/25/2021 at 10:00AM.. Patient noted desire to discuss BPCI-A.

## 2021-06-15 NOTE — PROGRESS NOTES
Clinic Care Coordination Contact    Community Health Worker Follow Up    Goals:   Goals Addressed                 This Visit's Progress       Patient Stated      Medical (pt-stated)   50%     Goal Statement:  I want to stay out of the hospital for the next 90 days.  Date Goal set:  2/1/2021  Barriers:  Recent hospitalization for sepsis  Strengths:  Able to identify and advocate for needs, supportive family/neighbors, motivated  Date to Achieve By:  2/23/21 (based on hospital discharge date of 11/23/20)  Patient expressed understanding of goal:  Yes  Action steps to achieve this goal:  1. I will take my medications as prescribed  2. I will attend all my appts as scheduled, and recommended follow up appts with my PCP.  3. I will start checking my blood pressure (BP) once a week and keep a record of my readings.  If I notice higher readings, or don't feel well, I will check my BP more often.  4. I will call my clinic if I start to feel sick or notice any change(s) in my health, and schedule an appt if needed  5. I will call the triage nurse line for advice, before going to the hospital  6. I will go to  or Walk-In-Clinic before going to the hospital, if I am unable to get an appt with my PCP  7. I will update the Care Coordination team during outreach calls and ask for additional support or resources, if needed              CHW Next Follow-up: none     CHW Plan: follow up in 2 weeks    Patient stated that is is not 100% but he is doing good he is staying inside due to the weather but he is doing good and did not need anything.

## 2021-06-15 NOTE — PROGRESS NOTES
Clinic Care Coordination Contact  Patient has completed all goals with Clinic Care Coordination.  Please review the chart and confirm if graduation is approved.    Patient stated that he has no aches and pains and he is doing good his sister came up for his Birthday and she just left yesterday to go [place].

## 2021-06-15 NOTE — PROGRESS NOTES
3/1/2021  CC Referral    Clinic Care Coordination Contact  Community Health Worker Initial Outreach        Patient accepts CC: No, has support from family. . Patient will be sent Care Coordination introduction letter for future reference.     The clinic Community Health Worker talked with the patient today at the request of the PCP to discuss possible Clinic Care Coordination enrollment.  The service was described to the patient and immediate needs were discussed.    The patient declined enrollement at this time.  The PCP is encouraged to refer in the future if the patient's needs change.

## 2021-06-15 NOTE — PROGRESS NOTES
OFFICE VISIT NOTE  Ike Cartagena   89 y.o. male            Assessment/Plan for  Ike Cartagena is a 89 y.o. male.  No Patient Care Coordination Note on file.       1. IHD (ischemic heart disease)  Doing well  - finasteride (PROSCAR) 5 mg tablet; Take 1 tablet (5 mg total) by mouth daily.  Dispense: 90 tablet; Refill: 3  - HM1(CBC and Differential)  - Basic Metabolic Panel  - Hepatic Profile  - Lipid Cascade  - HM1 (CBC with Diff)    2. PIN (prostatic intraepithelial neoplasia)  Stable  - finasteride (PROSCAR) 5 mg tablet; Take 1 tablet (5 mg total) by mouth daily.  Dispense: 90 tablet; Refill: 3    3. Essential hypertension  Controlled    4. Hyperlipemia  I ideal       5.  Mild thrombocytopenia-chronic-follow    Plan:  Same medication  3 months  Routine lab  Prescription refill    There are no Patient Instructions on file for this visit.    Diagnoses and all orders for this visit:    IHD (ischemic heart disease)  -     finasteride (PROSCAR) 5 mg tablet; Take 1 tablet (5 mg total) by mouth daily.  Dispense: 90 tablet; Refill: 3  -     HM1(CBC and Differential)  -     Basic Metabolic Panel  -     Hepatic Profile  -     Lipid Cascade  -     HM1 (CBC with Diff)    PIN (prostatic intraepithelial neoplasia)  -     finasteride (PROSCAR) 5 mg tablet; Take 1 tablet (5 mg total) by mouth daily.  Dispense: 90 tablet; Refill: 3    Essential hypertension    Hyperlipemia        Medications after visit  Current Outpatient Prescriptions   Medication Sig Dispense Refill     ascorbic acid (VITAMIN C) 100 MG tablet Take 1,000 mg by mouth daily.        aspirin 81 MG EC tablet Take 81 mg by mouth daily.       atorvastatin (LIPITOR) 10 MG tablet Take 1 tablet (10 mg total) by mouth bedtime. 90 tablet 3     finasteride (PROSCAR) 5 mg tablet Take 1 tablet (5 mg total) by mouth daily. 90 tablet 3     metoprolol succinate (TOPROL-XL) 50 MG 24 hr tablet Take 1 tablet (50 mg total) by mouth daily. 90 tablet 3     multivitamin therapeutic  (THERAGRAN) tablet Take 1 tablet by mouth daily.       nitroglycerin (NITROSTAT) 0.4 MG SL tablet Place 1 tablet (0.4 mg total) under the tongue every 5 (five) minutes as needed for chest pain. 100 tablet 3     OMEGA-3/DHA/EPA/FISH OIL (FISH OIL-OMEGA-3 FATTY ACIDS) 300-1,000 mg capsule Take 1 g by mouth daily.        omeprazole (PRILOSEC) 20 MG capsule Take 1 capsule (20 mg total) by mouth daily. 90 capsule 3     spironolactone (ALDACTONE) 50 MG tablet Take 1 tablet (50 mg total) by mouth daily. 90 tablet 3     UBIDECARENONE/VITAMIN E MIXED (COQ10  ORAL) Take 100 mg by mouth daily.       No current facility-administered medications for this visit.                       Augustin Lagunas MD  Internal medicine  [facility]  [phone]  [email or web address]    Much or all of the text in this note was generated through the use of Dragon Dictate voice-to-text software. Errors in spelling or words which seem out of context are unintentional.   Sound alike errors, in particular, may have escaped editing.                 Subjective:   Chief Complaint:  Hypertension; Follow-up; and Medication Refill    Here for follow-up    More adjusted regarding his wife.  She is eating better.  She is frail.  Her dementia is worsening.  He is doing okay.    No angina.  ISCHEMIC HEART DISEASE- Other than reported, the patient is not having any angina, chest pain, epigastric pain, dyspnea, palpitation, lightheadedness, syncope, excessive fatigue, exertional diaphoresis.-The patient is taking medication as prescribed. No change in nitroglycerin usage    Hypertension-There are no cardiovascular, respiratory, neurologic complaints.No claudication.There is no orthostasis.Patient is compliant with medications.  Medications reviewed.   No side effects from medication.    Hyperlipoproteinemia-patient is tolerating medication.  There are no myalgia, arthralgia, weakness.  No Bowel issues.  Liver profile  has been normal.  Patient has met cholesterol goals.    Review of Systems:     Extensive 10-point review of systems was performed. Please see the HPI for problem specific pertinent review of systems.     Patient does note active.  Proscar is really helped his urination flow.    Otherwise, the following systems are noncontributory including constitutional, eyes, ears, nose and throat, cardiovascular, respiratory, gastrointestinal, genitourinary, musculoskeletal,neurological, skin and/or breast, endocrine, hematologic/lymph, allergic/immunologic and psychiatric.              Medications:  Current Outpatient Prescriptions on File Prior to Visit   Medication Sig     ascorbic acid (VITAMIN C) 100 MG tablet Take 1,000 mg by mouth daily.      aspirin 81 MG EC tablet Take 81 mg by mouth daily.     atorvastatin (LIPITOR) 10 MG tablet Take 1 tablet (10 mg total) by mouth bedtime.     metoprolol succinate (TOPROL-XL) 50 MG 24 hr tablet Take 1 tablet (50 mg total) by mouth daily.     multivitamin therapeutic (THERAGRAN) tablet Take 1 tablet by mouth daily.     nitroglycerin (NITROSTAT) 0.4 MG SL tablet Place 1 tablet (0.4 mg total) under the tongue every 5 (five) minutes as needed for chest pain.     OMEGA-3/DHA/EPA/FISH OIL (FISH OIL-OMEGA-3 FATTY ACIDS) 300-1,000 mg capsule Take 1 g by mouth daily.      omeprazole (PRILOSEC) 20 MG capsule Take 1 capsule (20 mg total) by mouth daily.     spironolactone (ALDACTONE) 50 MG tablet Take 1 tablet (50 mg total) by mouth daily.     UBIDECARENONE/VITAMIN E MIXED (COQ10  ORAL) Take 100 mg by mouth daily.     [DISCONTINUED] finasteride (PROSCAR) 5 mg tablet Take 1 tablet (5 mg total) by mouth daily.     No current facility-administered medications on file prior to visit.             Allergies:No Known Allergies    PSFHx: Tobacco Status:  He  reports that he quit smoking about 50 years ago. He quit smokeless tobacco use about 51 years ago.   Alcohol Status:    History   Alcohol Use  "    Yes       reports that he quit smoking about 50 years ago. He quit smokeless tobacco use about 51 years ago. He reports that he drinks alcohol. His drug history is not on file.    Objective:    /88  Pulse 70  Ht 5' 5.75\" (1.67 m)  Wt 154 lb 0.6 oz (69.9 kg)  SpO2 97%  BMI 25.05 kg/m2  Weight:   Wt Readings from Last 3 Encounters:   01/12/18 154 lb 0.6 oz (69.9 kg)   10/06/17 150 lb 1.9 oz (68.1 kg)   06/30/17 151 lb (68.5 kg)     BP Readings from Last 3 Encounters:   01/12/18 140/88   10/06/17 122/78   06/30/17 112/64         General-appears well, no acute distress.  Skin: Normal. No rash or lesion  Head:  Normocephalic, symmetric  Speech-clear  Eyes: Eyes midline full EOM.  External exams normal.  No icterus  Neck:  No palpable masses, lymphadenopathy or tenderness. No thyromegaly or goiter  Carotid Arteries:  Equal pulsations bilateral.No Bruit, normal upstroke  Chest Wall: No deformity or pain elicited on compression.  Respiratory:  Normal respiratory effort.  Lungs are clear with good breath sounds.  No dullness.  No wheezing.  Heart: Regular rhythm.  Normal sounding S1, S2 without S3, S4, murmurs, rubs, or gallops.  Extremities-no edema good pulses  Gait normal  Neurologically intact.         Review of clinical lab tests  Lab Results   Component Value Date    WBC 6.7 10/06/2017    HGB 14.0 10/06/2017    HCT 41.5 10/06/2017     10/06/2017    CHOL 136 10/06/2017    TRIG 86 10/06/2017    HDL 47 10/06/2017    ALT 20 10/06/2017    AST 21 10/06/2017     10/06/2017    K 4.2 10/06/2017     10/06/2017    CREATININE 1.24 10/06/2017    BUN 28 10/06/2017    CO2 24 10/06/2017    PSA 4.2 12/23/2016    INR 1.03 04/18/2011     Lab Results   Component Value Date    LDLCALC 72 10/06/2017       Glucose   Date/Time Value Ref Range Status   10/06/2017 08:33  70 - 125 mg/dL Final   06/30/2017 09:07  70 - 125 mg/dL Final   03/24/2017 08:19  70 - 125 mg/dL Final   12/23/2016 08:48 AM " 110 70 - 125 mg/dL Final   06/10/2016 10:11  70 - 125 mg/dL Final   01/06/2016 02:17 PM 99 70 - 125 mg/dL Final   12/01/2015 09:03  74 - 125 mg/dL Final     No results found for this or any previous visit (from the past 24 hour(s)).    RADIOLOGY: No results found.    Review of recent consultation-

## 2021-06-15 NOTE — PROGRESS NOTES
Clinic Care Coordination Contact  [facility]/Voicemail       CHW Outreach attempted x 1.  Left message on patient's voicemail (cell #) with call back information and requested return call.  Plan: A CHW will try to reach patient again in 1-2 business days.    Telma ERICKSON Community Health Worker  [facility] Care Coordination  [facility]  Phone: [phone]

## 2021-06-15 NOTE — PROGRESS NOTES
2/26/2021  [facility] Referral    Clinic Care Coordination Contact  [facility]/Voicemail       Clinical Data: Care Coordinator Outreach  Outreach attempted x 1.  Left message on patient's voicemail with call back information and requested return call.  Plan: Care Coordinator will try to reach patient again in 1-2 business days.    CHW Outreach: 3-1-21

## 2021-06-15 NOTE — PROGRESS NOTES
"Clinic Care Coordination Contact    Situation:  BPCI-A graduation approval, hospital discharge date: 11/23/20    Background: Patient was hospitalized at [facility] from 11/18/20 to 11/23/20 with septic shock, sepsis due to E. Coli UTI, and has been followed by Care Coordination for 90 day post hospitalization monitoring, per BPCI-A guidelines.            Assessment:  90 day BPCI-A monitoring ended 2/23/21.     RN CC completed final BPIC-A outreach with patient.  Pt states he feels \"fine; no problems.\"  Reports he has been checking his BP once a week, on Wednesdays and keeps a record of his readings on his calendar.  The last 3 readings have been: 134/67 (2/3/21), 111/62 (2/10/21), and 133/73 (2/17/21).  He hasn't checked his BP yet today, but will do so later.      He states he is interested in getting a COVID-19 vaccine \"after the crowd dies down\" and asked if anyone comes to the home to give the vaccine.  RN CC informed pt at this time they are not doing in-home vaccinations; however, the CDC is working on a process for this option.  RNCC encouraged pt to discuss with his PCP for homebound vaccine options.       Plan: As BPCI-A monitoring completed, RN Care Coordinator placed a new Care Coordination referral through patient's primary care clinic.  CCRC team removed from care team, and BPCI-A episode resolved.       Kasia Samaniego RN Clinic Care Coordinator    "

## 2021-06-16 PROBLEM — I35.0: Status: ACTIVE | Noted: 2018-12-07

## 2021-06-16 PROBLEM — R65.21 SEPTIC SHOCK (H): Status: ACTIVE | Noted: 2020-11-18

## 2021-06-16 PROBLEM — A41.9 SEPTIC SHOCK (H): Status: ACTIVE | Noted: 2020-11-18

## 2021-06-16 PROBLEM — N39.0 SEPSIS DUE TO URINARY TRACT INFECTION (H): Status: ACTIVE | Noted: 2020-11-18

## 2021-06-16 PROBLEM — A41.9 SEPSIS DUE TO URINARY TRACT INFECTION (H): Status: ACTIVE | Noted: 2020-11-18

## 2021-06-16 PROBLEM — D69.6 THROMBOCYTOPENIA (H): Status: ACTIVE | Noted: 2018-12-07

## 2021-06-16 NOTE — TELEPHONE ENCOUNTER
PT is calling for refill of all his medications for 2021.  Pharmacy is [facility] mail order.    Last Face to Face with Dr. Lagunas = 01/31/2020    Pt had telephone encounter with Dr. Miller on 12/8/2020     Pt refused to schedule Establish Care appt = only wants to establish care via phone - will not do virtual video, will not come to clinic.

## 2021-06-16 NOTE — TELEPHONE ENCOUNTER
Ok to Est Care with Dr Ventura     Called and scheduled pt for telephone visit    Pt states that he doesn't want to go out - he is not interested in the vaccine at this time.      appt scheduled and refills can be handled at the visit

## 2021-06-16 NOTE — TELEPHONE ENCOUNTER
Last virtual Visit  12/08/2020 Dr. Miller  Notes:  1. PIN (prostatic intraepithelial neoplasia)  - finasteride (PROSCAR) 5 mg tablet; Take 1 tablet (5 mg total) by mouth daily.  Dispense: 90 tablet; Refill: 3     2. Essential hypertension  Very well controlled.  Goal systolic less than 130-140 mmHg.  Heart rate well controlled.  Counseled patient to use his wife's blood pressure machine to check his blood pressure 3 days a week and to return the values/numbers to us next week.  At that time we can decide whether to decrease the spironolactone dose to help decrease risk of dehydration and electrolyte abnormalities.     3. Hospital discharge follow-up  5. Thrombocytopenia (H)  Doing well overall.  Did have a case of thrombocytopenia but he deferred having his labs checked until later in 2021 due to the pandemic and trying to limit his outdoor exposure.  Otherwise renal function, electrolytes magnesium 1.7 and CBC otherwise unremarkable.  Counseled to let us know if he experiences any bleeding or any other new symptoms.     4. Constipation, unspecified constipation type  Unsure of cause but he does endorse a history of hemorrhoids.  Does not appear to be secondary to any medications but with spironolactone he is at risk for dehydration.  Counseled to please take 2-3 prunes per day and stay well-hydrated with 64-80 ounces of water.  Will let us know how he is tolerating it and can titrate the use of his prunes to more or less depending on his goal frequent bowel movements a day.     Last Filled:  metoprolol succinate (TOPROL-XL) 50 MG 24 hr tablet 90 tablet 3 3/5/2020  No   Sig - Route: Take 1 tablet (50 mg total) by mouth daily. - Oral   Sent to pharmacy as: metoprolol succinate ER 50 mg tablet,extended release 24 hr (TOPROL-XL)   E-Prescribing Status: Receipt confirmed by pharmacy (3/5/2020  9:25 PM CST)       Next OV:  Visit date not found        Medication teed up for provider signature

## 2021-06-16 NOTE — TELEPHONE ENCOUNTER
Former patient of Janneth & has not established care with another provider.  Please assign refill request to covering provider per Clinic standard process.      RN cannot approve Refill Request    RN can NOT refill this medication no pcp. Last office visit: 1/31/2020 Augustin Lagunas MD Last Physical: Visit date not found Last MTM visit: Visit date not found Last visit same specialty: 1/31/2020 Augustin Lagunas MD.  Next visit within 3 mo: Visit date not found  Next physical within 3 mo: Visit date not found      Edward Merritt, [facility] Connection Triage/Med Refill 3/19/2021    Requested Prescriptions   Pending Prescriptions Disp Refills     metoprolol succinate (TOPROL-XL) 50 MG 24 hr tablet [Pharmacy Med Name: METOPROLOL SUCCINATE ER TABS 50MG] 90 tablet 3     Sig: TAKE 1 TABLET DAILY       Beta-Blockers Refill Protocol Passed - 3/18/2021 11:32 AM        Passed - PCP or prescribing provider visit in past 12 months or next 3 months     Last office visit with prescriber/PCP: 1/31/2020 Augustin Lagunas MD OR same dept: Visit date not found OR same specialty: 1/31/2020 Augustin Lagunas MD  Last physical: Visit date not found Last MTM visit: Visit date not found   Next visit within 3 mo: Visit date not found  Next physical within 3 mo: Visit date not found  Prescriber OR PCP: Augustin Lagunas MD  Last diagnosis associated with med order: 1. IHD (ischemic heart disease)  - metoprolol succinate (TOPROL-XL) 50 MG 24 hr tablet [Pharmacy Med Name: METOPROLOL SUCCINATE ER TABS 50MG]; TAKE 1 TABLET DAILY  Dispense: 90 tablet; Refill: 3    If protocol passes may refill for 12 months if within 3 months of last provider visit (or a total of 15 months).             Passed - Blood pressure filed in past 12 months     BP Readings from Last 1 Encounters:   11/23/20 131/65

## 2021-06-16 NOTE — PROGRESS NOTES
Ike Cartagena is a 89 y.o. male who is being evaluated via a billable telephone visit.      What phone number would you like to be contacted at? [phone]  How would you like to obtain your AVS? AVS Preference: Mail a copy.    Assessment & Plan     Hypertension controlled 115/73 pulse 66 at 125/75 pulse 64 weight 135 pounds.  Stable.  Denies headache.  No chest pain or shortness of breath.  Refilled all medications but not herbal supplements or over-the-counter prescription items these were sent to [facility].    Bowel incontinence noted by patient advanced age 89 with history of rectal cancer.    Review of external notes as documented in note  11 minutes spent on the date of the encounter doing chart review, patient visit and documentation        No follow-ups on file.    Curt Ventura MD  [facility]   Ike Cartagena is 89 y.o. and presents today for the following health issues   HPI       Hypertension Follow-up      Do you check your blood pressure regularly outside of the clinic? Yes     Are you following a low salt diet? No    Are your blood pressures ever more than 140 on the top number (systolic) OR more       than 90 on the bottom number (diastolic), for example 140/90? No      How many servings of fruits and vegetables do you eat daily?  0-1    On average, how many sweetened beverages do you drink each day (Examples: soda, juice, sweet tea, etc.  Do NOT count diet or artificially sweetened beverages)?   0    How many days per week do you exercise enough to make your heart beat faster? 3 or less    How many minutes a day do you exercise enough to make your heart beat faster? 9 or less    How many days per week do you miss taking your medication? 0        Review of Systems  No blood in stool or urine denies chest pain or shortness of breath.  Medication list reviewed reconciled in the chart and refilled all as requested.    1 shot of alcohol per night  specifically scotch.  Non-smoker.  In the remote past he did smoke cigarettes.      Objective    Vitals - Patient Reported  Systolic (Patient Reported): 115  Diastolic (Patient Reported): 73  Weight (Patient Reported): 135 lb (61.2 kg)  Pulse (Patient Reported): 66    Physical Exam  No physical examination was done as this was a virtual visit by telephone.            Phone call duration: 11 minutes

## 2021-06-17 NOTE — PROGRESS NOTES
OFFICE VISIT NOTE  Ike Cratagena   89 y.o. male            Assessment/Plan for  Ike Cartagena is a 89 y.o. male.  No Patient Care Coordination Note on file.       1. IHD (ischemic heart disease)  Stable without angina  - atorvastatin (LIPITOR) 10 MG tablet; Take 1 tablet (10 mg total) by mouth at bedtime.  Dispense: 90 tablet; Refill: 3  - metoprolol succinate (TOPROL-XL) 50 MG 24 hr tablet; Take 1 tablet (50 mg total) by mouth daily.  Dispense: 90 tablet; Refill: 3  - omeprazole (PRILOSEC) 20 MG capsule; Take 1 capsule (20 mg total) by mouth daily.  Dispense: 90 capsule; Refill: 3    2. PIN (prostatic intraepithelial neoplasia)  On Rx    3. Essential hypertension  Good control  - spironolactone (ALDACTONE) 50 MG tablet; Take 1 tablet (50 mg total) by mouth daily.  Dispense: 90 tablet; Refill: 3  - Basic Metabolic Panel    4. Hyperlipemia  On Rx at goal  - Hepatic Profile  - Lipid Cascade    5. Thrombocytopenia  Asymptomatic monitor  - HM1(CBC and Differential)  - HM1 (CBC with Diff)       6.  Mild situational anxiety- wife with progressive memory loss.  Recently lost a friend Mr. Phelan who was in [facility] for Over 20 years    Plan:  Same medication  [employer] refer all  Routine lab  Same medication  Clinic visit 3 months    There are no Patient Instructions on file for this visit.    Diagnoses and all orders for this visit:    Hyperlipemia  -     Hepatic Profile  -     Lipid Cascade    IHD (ischemic heart disease)  -     atorvastatin (LIPITOR) 10 MG tablet; Take 1 tablet (10 mg total) by mouth at bedtime.  Dispense: 90 tablet; Refill: 3  -     metoprolol succinate (TOPROL-XL) 50 MG 24 hr tablet; Take 1 tablet (50 mg total) by mouth daily.  Dispense: 90 tablet; Refill: 3  -     omeprazole (PRILOSEC) 20 MG capsule; Take 1 capsule (20 mg total) by mouth daily.  Dispense: 90 capsule; Refill: 3    PIN (prostatic intraepithelial neoplasia)    Essential hypertension  -     spironolactone  (ALDACTONE) 50 MG tablet; Take 1 tablet (50 mg total) by mouth daily.  Dispense: 90 tablet; Refill: 3  -     Basic Metabolic Panel    Thrombocytopenia  -     HM1(CBC and Differential)  -     HM1 (CBC with Diff)        Medications after visit  Current Outpatient Prescriptions   Medication Sig Dispense Refill     ascorbic acid (VITAMIN C) 100 MG tablet Take 1,000 mg by mouth daily.        aspirin 81 MG EC tablet Take 81 mg by mouth daily.       atorvastatin (LIPITOR) 10 MG tablet Take 1 tablet (10 mg total) by mouth at bedtime. 90 tablet 3     finasteride (PROSCAR) 5 mg tablet Take 1 tablet (5 mg total) by mouth daily. 90 tablet 3     metoprolol succinate (TOPROL-XL) 50 MG 24 hr tablet Take 1 tablet (50 mg total) by mouth daily. 90 tablet 3     multivitamin therapeutic (THERAGRAN) tablet Take 1 tablet by mouth daily.       OMEGA-3/DHA/EPA/FISH OIL (FISH OIL-OMEGA-3 FATTY ACIDS) 300-1,000 mg capsule Take 1 g by mouth daily.        omeprazole (PRILOSEC) 20 MG capsule Take 1 capsule (20 mg total) by mouth daily. 90 capsule 3     spironolactone (ALDACTONE) 50 MG tablet Take 1 tablet (50 mg total) by mouth daily. 90 tablet 3     UBIDECARENONE/VITAMIN E MIXED (COQ10  ORAL) Take 100 mg by mouth daily.       No current facility-administered medications for this visit.                       Augustin Lagunas MD  Internal medicine  [facility]  [phone]  [email or web address]    Much or all of the text in this note was generated through the use of Dragon Dictate voice-to-text software. Errors in spelling or words which seem out of context are unintentional.   Sound alike errors, in particular, may have escaped editing.                 Subjective:   Chief Complaint:  Hypertension; Medication Refill; and Follow-up    In for follow-up    Lost a good friend  Wife with progressive memory issues  She is also getting weaker  Findings support in his face  Does not think he needs any  medication  Discussed [employer]'s [employer] for support if needed  No angina      ISCHEMIC HEART DISEASE- Other than reported, the patient is not having any angina, chest pain, epigastric pain, dyspnea, palpitation, lightheadedness, syncope, excessive fatigue, exertional diaphoresis.-The patient is taking medication as prescribed. No change in nitroglycerin usage    Hyperlipoproteinemia-patient is tolerating medication.  There are no myalgia, arthralgia, weakness.  No Bowel issues.  Liver profile has been normal.  Patient has met cholesterol goals.  Lab Results   Component Value Date    LDLCALC 73 01/12/2018       Mild thrombocytopenia.  Asymptomatic.  Is on aspirin therapy.  No bleeding issues.      Hypertension-There are no cardiovascular, respiratory, neurologic complaints.No claudication.There is no orthostasis.Patient is compliant with medications.  Medications reviewed.   No side effects from medication.    Review of Systems:     Extensive 10-point review of systems was performed. Please see the HPI for problem specific pertinent review of systems.     Patient does note appetite is good    Otherwise, the following systems are noncontributory including constitutional, eyes, ears, nose and throat, cardiovascular, respiratory, gastrointestinal, genitourinary, musculoskeletal,neurological, skin and/or breast, endocrine, hematologic/lymph, allergic/immunologic and psychiatric.              Medications:  Current Outpatient Prescriptions on File Prior to Visit   Medication Sig     ascorbic acid (VITAMIN C) 100 MG tablet Take 1,000 mg by mouth daily.      aspirin 81 MG EC tablet Take 81 mg by mouth daily.     finasteride (PROSCAR) 5 mg tablet Take 1 tablet (5 mg total) by mouth daily.     multivitamin therapeutic (THERAGRAN) tablet Take 1 tablet by mouth daily.     OMEGA-3/DHA/EPA/FISH OIL (FISH OIL-OMEGA-3 FATTY ACIDS) 300-1,000 mg capsule Take 1 g by mouth daily.      UBIDECARENONE/VITAMIN E MIXED (COQ10   "ORAL) Take 100 mg by mouth daily.     [DISCONTINUED] atorvastatin (LIPITOR) 10 MG tablet Take 1 tablet (10 mg total) by mouth bedtime.     [DISCONTINUED] metoprolol succinate (TOPROL-XL) 50 MG 24 hr tablet Take 1 tablet (50 mg total) by mouth daily.     [DISCONTINUED] omeprazole (PRILOSEC) 20 MG capsule Take 1 capsule (20 mg total) by mouth daily.     [DISCONTINUED] spironolactone (ALDACTONE) 50 MG tablet Take 1 tablet (50 mg total) by mouth daily.     No current facility-administered medications on file prior to visit.             Allergies:No Known Allergies    PSFHx: Tobacco Status:  He  reports that he quit smoking about 50 years ago. He quit smokeless tobacco use about 51 years ago.   Alcohol Status:    History   Alcohol Use     Yes       reports that he quit smoking about 50 years ago. He quit smokeless tobacco use about 51 years ago. He reports that he drinks alcohol. His drug history is not on file.    Objective:    /70  Pulse 72  Ht 5' 5.75\" (1.67 m)  Wt 153 lb 0.6 oz (69.4 kg)  SpO2 98%  BMI 24.89 kg/m2  Weight:   Wt Readings from Last 3 Encounters:   04/13/18 153 lb 0.6 oz (69.4 kg)   01/12/18 154 lb 0.6 oz (69.9 kg)   10/06/17 150 lb 1.9 oz (68.1 kg)     BP Readings from Last 3 Encounters:   04/13/18 132/70   01/12/18 140/88   10/06/17 122/78         General-appears well, no acute distress.  Skin: Normal. No rash or lesion  Head:  Normocephalic, symmetric  Speech-clear  Eyes: Eyes midline full EOM.  External exams normal.  No icterus  Neck:  No palpable masses, lymphadenopathy or tenderness. No thyromegaly or goiter  Carotid Arteries:  Equal pulsations bilateral.  Soft short bilateral bruit, normal upstroke.  I have heard this before.  Unchanged  Chest Wall: No deformity or pain elicited on compression.  Respiratory:  Normal respiratory effort.  Lungs are clear with good breath sounds.  No dullness.  No wheezing.  Heart: Regular rhythm.  Normal sounding S1, S2 without S3, S4, murmurs, rubs, or " gallops.  Extremities-no edema good pulses.     Neurologic intact      Review of clinical lab tests  Lab Results   Component Value Date    WBC 6.6 01/12/2018    HGB 14.1 01/12/2018    HCT 42.2 01/12/2018     (L) 01/12/2018    CHOL 138 01/12/2018    TRIG 88 01/12/2018    HDL 47 01/12/2018    ALT 24 01/12/2018    AST 25 01/12/2018     01/12/2018    K 4.0 01/12/2018     01/12/2018    CREATININE 1.08 01/12/2018    BUN 26 01/12/2018    CO2 26 01/12/2018    PSA 4.2 12/23/2016    INR 1.03 04/18/2011       Glucose   Date/Time Value Ref Range Status   01/12/2018 08:56  70 - 125 mg/dL Final   10/06/2017 08:33  70 - 125 mg/dL Final   06/30/2017 09:07  70 - 125 mg/dL Final   03/24/2017 08:19  70 - 125 mg/dL Final   12/23/2016 08:48  70 - 125 mg/dL Final   06/10/2016 10:11  70 - 125 mg/dL Final   01/06/2016 02:17 PM 99 70 - 125 mg/dL Final   12/01/2015 09:03  74 - 125 mg/dL Final     No results found for this or any previous visit (from the past 24 hour(s)).    RADIOLOGY: No results found.    Review of recent consultation-

## 2021-06-19 NOTE — PROGRESS NOTES
OFFICE VISIT NOTE  Ike Cartagena   89 y.o. male            Assessment/Plan for  Ike Cartagena is a 89 y.o. male.  No Patient Care Coordination Note on file.       1. IHD (ischemic heart disease)  Asymptomatic    2. Essential hypertension  Well-controlled    3. Hyperlipemia  On Rx    4. Thrombocytopenia (H)  Chronic stable         Plan:  Same meds  Laboratory  3 months  Emotional support-.  Dementia-health issues    There are no Patient Instructions on file for this visit.    Diagnoses and all orders for this visit:    IHD (ischemic heart disease)    Essential hypertension    Hyperlipemia    Thrombocytopenia (H)    Other orders  -     nitroglycerin (NITROSTAT) 0.4 MG SL tablet; Place 1 tablet (0.4 mg total) under the tongue every 5 (five) minutes as needed for chest pain.  Dispense: 25 tablet; Refill: 12        Medications after visit  Current Outpatient Prescriptions   Medication Sig Dispense Refill     ascorbic acid (VITAMIN C) 100 MG tablet Take 1,000 mg by mouth daily.        aspirin 81 MG EC tablet Take 81 mg by mouth daily.       atorvastatin (LIPITOR) 10 MG tablet Take 1 tablet (10 mg total) by mouth at bedtime. 90 tablet 3     finasteride (PROSCAR) 5 mg tablet Take 1 tablet (5 mg total) by mouth daily. 90 tablet 3     metoprolol succinate (TOPROL-XL) 50 MG 24 hr tablet Take 1 tablet (50 mg total) by mouth daily. 90 tablet 3     multivitamin therapeutic (THERAGRAN) tablet Take 1 tablet by mouth daily.       nitroglycerin (NITROSTAT) 0.4 MG SL tablet Place 0.4 mg under the tongue every 5 (five) minutes as needed for chest pain.       OMEGA-3/DHA/EPA/FISH OIL (FISH OIL-OMEGA-3 FATTY ACIDS) 300-1,000 mg capsule Take 1 g by mouth daily.        omeprazole (PRILOSEC) 20 MG capsule Take 1 capsule (20 mg total) by mouth daily. 90 capsule 3     spironolactone (ALDACTONE) 50 MG tablet Take 1 tablet (50 mg total) by mouth daily. 90 tablet 3     UBIDECARENONE/VITAMIN E MIXED (COQ10  ORAL) Take 100 mg by mouth  daily.       No current facility-administered medications for this visit.               Augustin Lagunas MD  Internal medicine  [facility]  [phone]  [email or web address]    Much or all of the text in this note was generated through the use of Dragon Dictate voice-to-text software. Errors in spelling or words which seem out of context are unintentional.   Sound alike errors, in particular, may have escaped editing.                 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  Medications reviewed.   No side effects from medication.    No angina.  ISCHEMIC HEART DISEASE- Other than reported, the patient is not having any angina, chest pain, epigastric pain, dyspnea, palpitation, lightheadedness, syncope, excessive fatigue, exertional diaphoresis.-The patient is taking medication as prescribed. No change in nitroglycerin usage    Hyperlipoproteinemia-patient is tolerating medication.  There are no myalgia, arthralgia, weakness.  No Bowel issues.  Liver profile has been normal.  Patient has met cholesterol goals.  Lab Results   Component Value Date    LDLCALC 72 04/13/2018       Asymptomatic mild chronic thrombocytopenia.  Patient is also on aspirin.  No bleeding issues.  Aspirin monitoring-taking daily as directed.  No dyspepsia, abdominal pain, no melena, hematochezia.  No bleeding.  No excessive bruising.  Review of Systems:     Extensive 10-point review of systems was performed. Please see the HPI for problem specific pertinent review of systems.     Patient does note history of ulcer dyspepsia.  On PPI no issues.  No  "melena    Otherwise, the following systems are noncontributory including constitutional, eyes, ears, nose and throat, cardiovascular, respiratory, gastrointestinal, genitourinary, musculoskeletal,neurological, skin and/or breast, endocrine, hematologic/lymph, allergic/immunologic and psychiatric.              Medications:  Current Outpatient Prescriptions on File Prior to Visit   Medication Sig     ascorbic acid (VITAMIN C) 100 MG tablet Take 1,000 mg by mouth daily.      aspirin 81 MG EC tablet Take 81 mg by mouth daily.     atorvastatin (LIPITOR) 10 MG tablet Take 1 tablet (10 mg total) by mouth at bedtime.     finasteride (PROSCAR) 5 mg tablet Take 1 tablet (5 mg total) by mouth daily.     metoprolol succinate (TOPROL-XL) 50 MG 24 hr tablet Take 1 tablet (50 mg total) by mouth daily.     multivitamin therapeutic (THERAGRAN) tablet Take 1 tablet by mouth daily.     OMEGA-3/DHA/EPA/FISH OIL (FISH OIL-OMEGA-3 FATTY ACIDS) 300-1,000 mg capsule Take 1 g by mouth daily.      omeprazole (PRILOSEC) 20 MG capsule Take 1 capsule (20 mg total) by mouth daily.     spironolactone (ALDACTONE) 50 MG tablet Take 1 tablet (50 mg total) by mouth daily.     UBIDECARENONE/VITAMIN E MIXED (COQ10  ORAL) Take 100 mg by mouth daily.     No current facility-administered medications on file prior to visit.             Allergies:No Known Allergies    PSFHx: Tobacco Status:  He  reports that he quit smoking about 50 years ago. He quit smokeless tobacco use about 51 years ago.   Alcohol Status:    History   Alcohol Use     Yes       reports that he quit smoking about 50 years ago. He quit smokeless tobacco use about 51 years ago. He reports that he drinks alcohol. His drug history is not on file.    Objective:    /70  Pulse 64  Ht 5' 5.75\" (1.67 m)  Wt 149 lb 1.9 oz (67.6 kg)  SpO2 96%  BMI 24.25 kg/m2  Weight:   Wt Readings from Last 3 Encounters:   07/20/18 149 lb 1.9 oz (67.6 kg)   04/13/18 153 lb 0.6 oz (69.4 kg) "   01/12/18 154 lb 0.6 oz (69.9 kg)     BP Readings from Last 3 Encounters:   07/20/18 138/70   04/13/18 132/70   01/12/18 140/88         General-appears well, no acute distress.  Skin: Normal. No rash or lesion  Head:  Normocephalic, symmetric  Speech-clear  Eyes: Eyes midline full EOM.  External exams normal.  No icterus  Neck:  No palpable masses, lymphadenopathy or tenderness. No thyromegaly or goiter  Carotid Arteries:  Equal pulsations bilateral.No Bruit, normal upstroke  Chest Wall: No deformity or pain elicited on compression.  Respiratory:  Normal respiratory effort.  Lungs are clear with good breath sounds.  No dullness.  No wheezing.  Heart: Regular rhythm.  Normal sounding S1, S2 without S3, S4, 2/6 to 3/6 murmur of aortic stenosis Extremities-no edema good peripheral pulses.         Review of clinical lab tests  Lab Results   Component Value Date    WBC 7.4 04/13/2018    HGB 14.5 04/13/2018    HCT 44.7 04/13/2018     04/13/2018    CHOL 131 04/13/2018    TRIG 69 04/13/2018    HDL 45 04/13/2018    ALT 30 04/13/2018    AST 24 04/13/2018     04/13/2018    K 4.2 04/13/2018     04/13/2018    CREATININE 1.35 (H) 04/13/2018    BUN 34 (H) 04/13/2018    CO2 23 04/13/2018    PSA 4.2 12/23/2016    INR 1.03 04/18/2011       Glucose   Date/Time Value Ref Range Status   04/13/2018 09:03  70 - 125 mg/dL Final   01/12/2018 08:56  70 - 125 mg/dL Final   10/06/2017 08:33  70 - 125 mg/dL Final   06/30/2017 09:07  70 - 125 mg/dL Final   03/24/2017 08:19  70 - 125 mg/dL Final   12/23/2016 08:48  70 - 125 mg/dL Final   06/10/2016 10:11  70 - 125 mg/dL Final   01/06/2016 02:17 PM 99 70 - 125 mg/dL Final   12/01/2015 09:03  74 - 125 mg/dL Final     No results found for this or any previous visit (from the past 24 hour(s)).    RADIOLOGY: No results found.    Review of recent consultation-

## 2021-06-19 NOTE — LETTER
Letter by Augustin Lagunas MD at      Author: Augustin Lagunas MD Service: -- Author Type: --    Filed:  Encounter Date: 4/6/2019 Status: (Other)         Ike Cartagena  [address] [facility] [address]             April 6, 2019         Dear Mr. Cartagena,    Below are the results from your recent visit:    Resulted Orders   Renal Function Profile   Result Value Ref Range    Albumin 4.0 3.5 - 5.0 g/dL    Calcium 10.1 8.5 - 10.5 mg/dL    Phosphorus 3.9 2.5 - 4.5 mg/dL    Glucose 99 70 - 125 mg/dL    BUN 29 (H) 8 - 28 mg/dL    Creatinine 1.13 0.70 - 1.30 mg/dL    Sodium 141 136 - 145 mmol/L    Potassium 4.4 3.5 - 5.0 mmol/L    Chloride 107 98 - 107 mmol/L    CO2 24 22 - 31 mmol/L    Anion Gap, Calculation 10 5 - 18 mmol/L    GFR MDRD Af Amer >60 >60 mL/min/1.73m2    GFR MDRD Non Af Amer >60 >60 mL/min/1.73m2    Narrative    Fasting Glucose reference range is 70-99 mg/dL per  American Diabetes Association (ADA) guidelines.       I enjoyed seeing you in the office at your appointment.  I am pleased with the results of your lab tests.    Any results with (*) are not of any significance.        Please call with questions or contact us using [employer].    Sincerely,        Electronically signed by Augustin Lagunas MD

## 2021-06-19 NOTE — LETTER
Letter by Augustin Lagunas MD at      Author: Augustin Lagunas MD Service: -- Author Type: --    Filed:  Encounter Date: 7/12/2019 Status: (Other)         Ike Cartagena  [address] [facility] [address]             July 12, 2019         Dear Mr. Cartagena,    Below are the results from your recent visit:    Resulted Orders   Basic Metabolic Panel   Result Value Ref Range    Sodium 141 136 - 145 mmol/L    Potassium 4.4 3.5 - 5.0 mmol/L    Chloride 107 98 - 107 mmol/L    CO2 26 22 - 31 mmol/L    Anion Gap, Calculation 8 5 - 18 mmol/L    Glucose 98 70 - 125 mg/dL    Calcium 10.0 8.5 - 10.5 mg/dL    BUN 29 (H) 8 - 28 mg/dL    Creatinine 1.18 0.70 - 1.30 mg/dL    GFR MDRD Af Amer >60 >60 mL/min/1.73m2    GFR MDRD Non Af Amer 58 (L) >60 mL/min/1.73m2    Narrative    Fasting Glucose reference range is 70-99 mg/dL per  American Diabetes Association (ADA) guidelines.   Hepatic Profile   Result Value Ref Range    Bilirubin, Total 0.7 0.0 - 1.0 mg/dL    Bilirubin, Direct 0.3 <=0.5 mg/dL    Protein, Total 7.1 6.0 - 8.0 g/dL    Albumin 3.9 3.5 - 5.0 g/dL    Alkaline Phosphatase 64 45 - 120 U/L    AST 20 0 - 40 U/L    ALT 20 0 - 45 U/L   Lipid Cascade   Result Value Ref Range    Cholesterol 132 <=199 mg/dL    Triglycerides 62 <=149 mg/dL    HDL Cholesterol 53 >=40 mg/dL    LDL Calculated 67 <=129 mg/dL    Patient Fasting > 8hrs? Yes    HM1 (CBC with Diff)   Result Value Ref Range    WBC 7.0 4.0 - 11.0 thou/uL    RBC 4.45 4.40 - 6.20 mill/uL    Hemoglobin 13.7 (L) 14.0 - 18.0 g/dL    Hematocrit 41.2 40.0 - 54.0 %    MCV 93 80 - 100 fL    MCH 30.8 27.0 - 34.0 pg    MCHC 33.3 32.0 - 36.0 g/dL    RDW 12.9 11.0 - 14.5 %    Platelets 148 140 - 440 thou/uL    MPV 12.0 8.5 - 12.5 fL    Neutrophils % 69 50 - 70 %    Lymphocytes % 17 (L) 20 - 40 %    Monocytes % 11 (H) 2 - 10 %    Eosinophils % 2 0 - 6 %    Basophils % 1 0 - 2 %    Neutrophils Absolute 4.8 2.0 - 7.7 thou/uL    Lymphocytes Absolute 1.2 0.8 - 4.4 thou/uL    Monocytes  Absolute 0.8 0.0 - 0.9 thou/uL    Eosinophils Absolute 0.2 0.0 - 0.4 thou/uL    Basophils Absolute 0.0 0.0 - 0.2 thou/uL       I enjoyed seeing you in the office at your appointment.  I am pleased with the results of your lab tests.    Any results with (*) are not of any significance.  Cholesterol excellent.      Please call with questions or contact us using [employer].    Sincerely,        Electronically signed by Augustin Lagunas MD

## 2021-06-19 NOTE — LETTER
Letter by Augustin Lagunas MD at      Author: Augustin Lagunas MD Service: -- Author Type: --    Filed:  Encounter Date: 10/23/2019 Status: Signed         Ike Cartagena  [address] [facility] [address] [address]             October 23, 2019         Dear Mr. Cartagena,    Below are the results from your recent visit:    Resulted Orders   Renal Function Profile   Result Value Ref Range    Albumin 4.1 3.5 - 5.0 g/dL    Calcium 9.7 8.5 - 10.5 mg/dL    Phosphorus 3.3 2.5 - 4.5 mg/dL    Glucose 98 70 - 125 mg/dL    BUN 34 (H) 8 - 28 mg/dL    Creatinine 1.28 0.70 - 1.30 mg/dL    Sodium 140 136 - 145 mmol/L    Potassium 4.4 3.5 - 5.0 mmol/L    Chloride 105 98 - 107 mmol/L    CO2 26 22 - 31 mmol/L    Anion Gap, Calculation 9 5 - 18 mmol/L    GFR MDRD Af Amer >60 >60 mL/min/1.73m2    GFR MDRD Non Af Amer 53 (L) >60 mL/min/1.73m2    Narrative    Fasting Glucose reference range is 70-99 mg/dL per  American Diabetes Association (ADA) guidelines.       I enjoyed seeing you in the office at your appointment.  I am pleased with the results of your lab tests.           Please call with questions or contact us using [employer].    Sincerely,        Electronically signed by Augustin Lagunas MD

## 2021-06-21 NOTE — LETTER
Letter by Mauri Bradshaw CHW at      Author: Mauri Bradshaw CHW Service: -- Author Type: --    Filed:  Encounter Date: 2/26/2021 Status: (Other)       CARE COORDINATION  [facility]  [address] [place] [address]    March 1, 2021    Ike Cartagena  [address] [facility] [address]      Dear Ike,    We are clinic community health workers who work at [facility].  We wanted to thank you for spending the time to talk with us.  Below is a description of clinic care coordination and how I can further assist you.      The clinic care coordination team is made up of a registered nurse,  and community health worker who understand the health care system. The goal of clinic care coordination is to help you manage your health and improve access to the health care system in the most efficient manner. The team can assist you in meeting your health care goals by providing education, coordinating services, strengthening the communication among your providers and supporting you with any resource needs.    Please feel free to contact the Community Health Worker at [facility] [phone] with any questions or concerns. We are focused on providing you with the highest-quality healthcare experience possible and that all starts with you.         Sincerely,       CHW   Clinic Care Team at [facility]

## 2021-06-21 NOTE — LETTER
"Letter by Kasia Saamniego, RN at      Author: Kasia Samaniego, RN Service: -- Author Type: --    Filed:  Encounter Date: 2/1/2021 Status: (Other)       Care Plan  About Me:    Patient Name:  Ike Cartagena    YOB: 1927  Age:         89 y.o.   [facility] MRN:    [id number] Telephone Information:  Home Phone [phone]   Mobile [phone]       Address:  [address] [place] [id number] Email address:  No e-mail address on record      Emergency Contact(s)  Extended Emergency Contact Information  Name: Helena Keane  Relation: Child        Primary language:  English     needed? No   [facility] [employer] [facility]:  [phone] op. 1  Other communication barriers: Glasses (Reading glasses if needed \"for very small print.\")  Preferred Method of Communication:     Current living arrangement: I live alone  Mobility Status/ Medical Equipment: Independent    Health Maintenance  Health Maintenance Reviewed: Up to date - Please schedule an establish care visit with a new PCP at your clinic.    My Access Plan  Medical Emergency 911   Primary Clinic Line Augustin Lagunas MD - [phone]   24 Hour Appointment Line [phone] or  [phone]) (toll-free)   24 Hour Nurse Line [phone] (toll-free)   Preferred Urgent Care (Urgent Care near him, does not recall name of facility)   Preferred Hospital [facility]  [phone]   Preferred Pharmacy [facility] - [place], [place] - [address] [place] [address]     Behavioral Health Crisis Line The National Suicide Prevention Lifeline at 1-339.554.7739 or 911       My Care Team Members  Patient Care Team       Relationship Specialty Notifications Start End    Augustin Lagunas MD PCP - General Internal Medicine  11/30/15     Phone: [phone] Fax: [phone]         17 [address] [address]    Augustin Lagunas MD Assigned PCP   12/14/20     Phone: [phone] Fax: [phone]         17 " [address] [address]    Kim Humphrey, CHW Community Health Worker Primary Care - [facility] Admissions 1/25/21     Kasia Samaniego, RN Lead Care Coordinator Primary Care - [facility] Admissions 2/1/21     BPCI-A             My Care Plans  Self Management and Treatment Plan  Goals and (Comments)  Goals     General    Medical (pt-stated)     Notes - Note edited  2/3/2021 11:21 AM by Kasia Samaniego, RN    Goal Statement:  I want to stay out of the hospital for the next 90 days.  Date Goal set:  2/1/2021  Barriers:  Recent hospitalization for sepsis  Strengths:  Able to identify and advocate for needs, supportive family/neighbors, motivated  Date to Achieve By:  2/23/21 (based on hospital discharge date of 11/23/20)  Patient expressed understanding of goal:  Yes  Action steps to achieve this goal:  1. I will take my medications as prescribed  2. I will attend all my appts as scheduled, and recommended follow up appts with my PCP.  3. I will start checking my blood pressure (BP) once a week and keep a record of my readings.  If I notice higher readings, or don't feel well, I will check my BP more often.  4. I will call my clinic if I start to feel sick or notice any change(s) in my health, and schedule an appt if needed  5. I will call the triage nurse line for advice, before going to the hospital  6. I will go to  or Walk-In-Clinic before going to the hospital, if I am unable to get an appt with my PCP  7. I will update the Care Coordination team during outreach calls and ask for additional support or resources, if needed        Advance Care Plans/Directives Type:  Pt declined at this time     My Medical and Care Information  Problem List   Patient Active Problem List   Diagnosis   ? IHD (ischemic heart disease)   ? HTN (hypertension)   ? Hyperlipemia   ? PIN (prostatic intraepithelial neoplasia)   ? Rectal tumor   ? Moderate aortic stenosis by prior echocardiography   ? Thrombocytopenia (H)   ?  Sepsis due to urinary tract infection (H)   ? Septic shock (H)   ? Acute respiratory failure with hypoxemia (H)   ? Acute kidney injury (BRYNN) with acute tubular necrosis (ATN) (H)   ? Encephalopathy   ? E. coli UTI          Current Medications and Allergies:  Current Outpatient Medications   Medication Sig Dispense Refill   ? ASCORBIC ACID ORAL Take 1,000 mg by mouth daily.     ? aspirin 81 MG EC tablet Take 81 mg by mouth daily.     ? atorvastatin (LIPITOR) 10 MG tablet Take 1 tablet (10 mg total) by mouth daily. 90 tablet 3   ? finasteride (PROSCAR) 5 mg tablet Take 1 tablet (5 mg total) by mouth daily. 90 tablet 3   ? metoprolol succinate (TOPROL-XL) 50 MG 24 hr tablet Take 1 tablet (50 mg total) by mouth daily. 90 tablet 3   ? multivitamin therapeutic (THERAGRAN) tablet Take 1 tablet by mouth daily.     ? OMEGA-3/DHA/EPA/FISH OIL (FISH OIL-OMEGA-3 FATTY ACIDS) 300-1,000 mg capsule Take 1 g by mouth daily.      ? omeprazole (PRILOSEC) 20 MG capsule Take 1 capsule (20 mg total) by mouth daily before breakfast. 90 capsule 3   ? spironolactone (ALDACTONE) 50 MG tablet Take 1 tablet (50 mg total) by mouth daily. 90 tablet 3   ? ubidecarenone (CO Q-10 ORAL) Take 100 mg by mouth daily.     ? nitroglycerin (NITROSTAT) 0.4 MG SL tablet Place 1 tablet (0.4 mg total) under the tongue every 5 (five) minutes as needed for chest pain. 25 tablet 12     Care Coordination Start Date: 2/1/2021   Frequency of Care Coordination: monthly   Form Last Updated: 02/03/2021

## 2021-06-21 NOTE — PROGRESS NOTES
OFFICE VISIT NOTE  Ike Cartagena   89 y.o. male            Assessment/Plan for  Ike Cartagena is a 89 y.o. male.  No Patient Care Coordination Note on file.       1. IHD (ischemic heart disease)  Dyspnea.  Possible anginal equivalent.  Use prophylactic nitroglycerin.  With his heart murmur check echocardiogram.  He has had a history of rheumatic fever  - finasteride (PROSCAR) 5 mg tablet; Take 1 tablet (5 mg total) by mouth daily.  Dispense: 90 tablet; Refill: 3    2. PIN (prostatic intraepithelial neoplasia)  Continue finasteride  - finasteride (PROSCAR) 5 mg tablet; Take 1 tablet (5 mg total) by mouth daily.  Dispense: 90 tablet; Refill: 3    3. Essential hypertension  Good control    4. Hyperlipemia  On Rx    5. Rectal tumor  Has upcoming colonoscopy      6.  Abnormal chest x-ray left lingular area.  Calcified.  Reviewed x-ray 99 report-normal.  History of positive Mantoux.  Normal chest x-rays.  Served in the [employer].  Other than dyspnea no respiratory symptoms.  Does not recall ever having an abnormal chest x-ray.  I will get a CT scan especially with his weight loss.  I will see him back in 6 weeks  7.  Soft murmur of aortic stenosis  8.  10 pound weight loss.  Unremarkable exam    Plan:  Laboratory  CT lung  Echocardiogram  Ensure.  RTC 6 weeks    There are no Patient Instructions on file for this visit.    Diagnoses and all orders for this visit:    IHD (ischemic heart disease)  -     Lipid Cascade  -     Electrocardiogram Perform - Clinic  -     Echo Complete; Future; Expected date: 10/26/18    PIN (prostatic intraepithelial neoplasia)  -     finasteride (PROSCAR) 5 mg tablet; Take 1 tablet (5 mg total) by mouth daily.  Dispense: 90 tablet; Refill: 3    Essential hypertension    Hyperlipemia    Rectal tumor    Dyspnea  -     HM1(CBC and Differential)  -     Erythrocyte Sedimentation Rate  -     Urinalysis-UC if Indicated  -     Basic Metabolic Panel  -     Hepatic Profile  -     Thyroid  Cascade  -     Electrocardiogram Perform - Clinic  -     XR Chest 2 Views; Future; Expected date: 10/26/18  -     BNP(B-type Natriuretic Peptide)  -     HM1 (CBC with Diff)  -     Echo Complete; Future; Expected date: 10/26/18  -     CT Chest With Contrast; Future; Expected date: 10/26/18    Abnormality of lung on CXR  -     CT Chest With Contrast; Future; Expected date: 10/26/18        Medications after visit  Current Outpatient Prescriptions   Medication Sig Dispense Refill     ascorbic acid (VITAMIN C) 100 MG tablet Take 1,000 mg by mouth daily.        aspirin 81 MG EC tablet Take 81 mg by mouth daily.       atorvastatin (LIPITOR) 10 MG tablet Take 1 tablet (10 mg total) by mouth at bedtime. 90 tablet 3     finasteride (PROSCAR) 5 mg tablet Take 1 tablet (5 mg total) by mouth daily. 90 tablet 3     metoprolol succinate (TOPROL-XL) 50 MG 24 hr tablet Take 1 tablet (50 mg total) by mouth daily. 90 tablet 3     multivitamin therapeutic (THERAGRAN) tablet Take 1 tablet by mouth daily.       nitroglycerin (NITROSTAT) 0.4 MG SL tablet Place 1 tablet (0.4 mg total) under the tongue every 5 (five) minutes as needed for chest pain. 25 tablet 12     OMEGA-3/DHA/EPA/FISH OIL (FISH OIL-OMEGA-3 FATTY ACIDS) 300-1,000 mg capsule Take 1 g by mouth daily.        omeprazole (PRILOSEC) 20 MG capsule Take 1 capsule (20 mg total) by mouth daily. 90 capsule 3     spironolactone (ALDACTONE) 50 MG tablet Take 1 tablet (50 mg total) by mouth daily. 90 tablet 3     UBIDECARENONE/VITAMIN E MIXED (COQ10  ORAL) Take 100 mg by mouth daily.       No current facility-administered medications for this visit.              This provider spent greater than 40 min. face-to-face time with the patient and/or his family.  More than half this time was spent in counseling and or coordination of care which was consistent with the nature of this patient's problems which are listed and described in the assessment and plan.        Augustin Lagunas,  MD  Internal medicine  [facility]  [phone]  [email or web address]    Much or all of the text in this note was generated through the use of Dragon Dictate voice-to-text software. Errors in spelling or words which seem out of context are unintentional.   Sound alike errors, in particular, may have escaped editing.                 Subjective:   Chief Complaint:  Follow-up (3 mo f/u-fasting labs)    New issue  Dyspnea on exertion  No cough  In addition he has lost 10 pounds  He feels well  He is probably eating a little less  His belt is in the notch as well  He denies pain      Dyspnea on exertion sounds somewhat like angina.  He is taking his metoprolol.  He does not use nitroglycerin.  This is not been prolonged.  He is not having any chest discomfort.  Patient does have known coronary disease.  He has no cough.  No hemoptysis.  He has a history of a positive Mantoux.  2 of his uncles had active tuberculosis.  His x-rays have been normal.  He has served in the [employer]    Patient has lost about 10 pounds or 7% of his body weight.  He feels okay he does have a history of rectal cancer.  He undergoes routine proctoscopy which is been normal.  No melena hematochezia abdominal pain bloating etc.    He is not in any pain.  He has no fever chills sweats    He is tolerating his cholesterol medication.  No myalgia or arthralgia.          Review of Systems:     Extensive 10-point review of systems was performed. Please see the HPI for problem specific pertinent review of systems.     Patient does note Sari is deteriorating.  He is considering hospice.  He does not seem depressed.    Otherwise, the following systems are noncontributory including constitutional, eyes, ears, nose and throat, cardiovascular, respiratory, gastrointestinal, genitourinary, musculoskeletal,neurological, skin and/or breast, endocrine, hematologic/lymph, allergic/immunologic and psychiatric.           "    Medications:  Current Outpatient Prescriptions on File Prior to Visit   Medication Sig     ascorbic acid (VITAMIN C) 100 MG tablet Take 1,000 mg by mouth daily.      aspirin 81 MG EC tablet Take 81 mg by mouth daily.     atorvastatin (LIPITOR) 10 MG tablet Take 1 tablet (10 mg total) by mouth at bedtime.     metoprolol succinate (TOPROL-XL) 50 MG 24 hr tablet Take 1 tablet (50 mg total) by mouth daily.     multivitamin therapeutic (THERAGRAN) tablet Take 1 tablet by mouth daily.     nitroglycerin (NITROSTAT) 0.4 MG SL tablet Place 1 tablet (0.4 mg total) under the tongue every 5 (five) minutes as needed for chest pain.     OMEGA-3/DHA/EPA/FISH OIL (FISH OIL-OMEGA-3 FATTY ACIDS) 300-1,000 mg capsule Take 1 g by mouth daily.      omeprazole (PRILOSEC) 20 MG capsule Take 1 capsule (20 mg total) by mouth daily.     spironolactone (ALDACTONE) 50 MG tablet Take 1 tablet (50 mg total) by mouth daily.     UBIDECARENONE/VITAMIN E MIXED (COQ10  ORAL) Take 100 mg by mouth daily.     [DISCONTINUED] finasteride (PROSCAR) 5 mg tablet Take 1 tablet (5 mg total) by mouth daily.     No current facility-administered medications on file prior to visit.             Allergies:No Known Allergies    PSFHx: Tobacco Status:  He  reports that he quit smoking about 50 years ago. He quit smokeless tobacco use about 51 years ago.   Alcohol Status:    History   Alcohol Use     Yes       reports that he quit smoking about 50 years ago. He quit smokeless tobacco use about 51 years ago. He reports that he drinks alcohol. His drug history is not on file.    Objective:    /70  Pulse 79  Ht 5' 5.75\" (1.67 m)  Wt 140 lb (63.5 kg)  SpO2 90% Comment: RA  BMI 22.77 kg/m2  Weight:   Wt Readings from Last 3 Encounters:   10/26/18 140 lb (63.5 kg)   07/20/18 149 lb 1.9 oz (67.6 kg)   04/13/18 153 lb 0.6 oz (69.4 kg)     Wt Readings from Last 9 Encounters:   10/26/18 140 lb (63.5 kg)   07/20/18 149 lb 1.9 oz (67.6 kg)   04/13/18 153 lb " 0.6 oz (69.4 kg)   01/12/18 154 lb 0.6 oz (69.9 kg)   10/06/17 150 lb 1.9 oz (68.1 kg)   06/30/17 151 lb (68.5 kg)   03/24/17 153 lb (69.4 kg)   12/23/16 152 lb 0.6 oz (69 kg)   09/23/16 150 lb 1.3 oz (68.1 kg)       BP Readings from Last 10 Encounters:   10/26/18 136/70   07/20/18 138/70   04/13/18 132/70   01/12/18 140/88   10/06/17 122/78   06/30/17 112/64   03/24/17 138/70   12/23/16 114/62   09/23/16 112/70   06/10/16 116/62         General-appears well, no acute distress.  Weight loss noted  Color is good  He is well-groomed as always  Skin without rash or bruising  No adenopathy  Face symmetric  No thyromegaly  Pulses regular  Lungs are perfectly clear without dullness.  No wheezing  Cardiac 2/6 soft aortic stenosis type murmur best heard at the apex  No radiation to axilla    Benign abdomen  No inguinal adenopathy  No edema  Gait normal    Personally reviewed ECG-normal    Reviewed chest x-ray.  This shows an abnormality left lingular area about a 2 x 6 calcified lesion.  Seen both on PA and lateral.  It is not classic HL.  The apices of his lungs look okay.      Review of clinical lab tests  Lab Results   Component Value Date    WBC 6.8 07/20/2018    HGB 14.0 07/20/2018    HCT 43.0 07/20/2018     07/20/2018    CHOL 123 07/20/2018    TRIG 69 07/20/2018    HDL 46 07/20/2018    ALT 22 07/20/2018    AST 23 07/20/2018     07/20/2018    K 4.2 07/20/2018     (H) 07/20/2018    CREATININE 1.12 07/20/2018    BUN 24 07/20/2018    CO2 23 07/20/2018    PSA 4.2 12/23/2016    INR 1.03 04/18/2011       Glucose   Date/Time Value Ref Range Status   07/20/2018 08:57  70 - 125 mg/dL Final   04/13/2018 09:03  70 - 125 mg/dL Final   01/12/2018 08:56  70 - 125 mg/dL Final   10/06/2017 08:33  70 - 125 mg/dL Final   06/30/2017 09:07  70 - 125 mg/dL Final   03/24/2017 08:19  70 - 125 mg/dL Final   12/23/2016 08:48  70 - 125 mg/dL Final   06/10/2016 10:11  70 - 125  mg/dL Final   01/06/2016 02:17 PM 99 70 - 125 mg/dL Final   12/01/2015 09:03  74 - 125 mg/dL Final     Recent Results (from the past 24 hour(s))   Electrocardiogram Perform - Clinic   Result Value Ref Range    SYSTOLIC BLOOD PRESSURE  mmHg    DIASTOLIC BLOOD PRESSURE  mmHg    VENTRICULAR RATE 82 BPM    ATRIAL RATE 82 BPM    P-R INTERVAL 176 ms    QRS DURATION 102 ms    Q-T INTERVAL 388 ms    QTC CALCULATION (BEZET) 453 ms    P Axis 69 degrees    R AXIS 74 degrees    T AXIS 53 degrees    MUSE DIAGNOSIS       Normal sinus rhythm  Normal ECG  When compared with ECG of 23-DEC-2016 08:45,  No significant change was found         RADIOLOGY: No results found.    Review of recent consultation-reviewed chest x-ray report 1999 is negative.

## 2021-06-21 NOTE — LETTER
Letter by Kasia Samaniego RN at      Author: Kasia Samaniego RN Service: -- Author Type: --    Filed:  Encounter Date: 2/1/2021 Status: (Other)       CARE COORDINATION  [facility]  [address] [place] [address]     February 3, 2021    Ike Cartagena  [address] [address]      Dear Ike,    I am a clinic care coordinator with [facility].  I wanted to thank you for spending the time to talk with me.  Below is a description of clinic care coordination and how I can further assist you.      The clinic care coordination team is made up of a registered nurse,  and community health worker who understand the health care system. The goal of clinic care coordination is to help you manage your health and improve access to the health care system in the most efficient manner. The team can assist you in meeting your health care goals by providing education, coordinating services, strengthening the communication among your providers and supporting you with any resource needs.    Please feel free to contact me at [phone] with any questions or concerns. We are focused on providing you with the highest-quality healthcare experience possible and that all starts with you.     Sincerely,     Kasia Samaniego RN Clinic Care Coordinator     Enclosed: I have enclosed a copy of the Care Plan. This has helpful information and goals that we have talked about. Please keep this in an easy to access place to use as needed.

## 2021-06-21 NOTE — PROGRESS NOTES
Call patient  Wonderful news  CT shows no evidence of cancer    Augustin Lagunas MD  Internal medicine  [facility]  [phone]  [email or web address]

## 2021-06-22 NOTE — ANESTHESIA POSTPROCEDURE EVALUATION
Patient: Ike Cartagena  EXAM UNDER ANESTHESIA FULGURATION OF RECTAL POLYP  Anesthesia type: MAC    Patient location: Phase II Recovery  Last vitals:   Vitals:    01/03/19 1015   BP: 119/58   Pulse: 70   Resp: 18   Temp:    SpO2: 95%     Post vital signs: stable  Level of consciousness: awake and responds to simple questions  Post-anesthesia pain: pain controlled  Post-anesthesia nausea and vomiting: no  Pulmonary: unassisted, return to baseline  Cardiovascular: stable and blood pressure at baseline  Hydration: adequate  Anesthetic events: no    QCDR Measures:  ASA# 11 - Carlyn-op Cardiac Arrest: ASA11B - Patient did NOT experience unanticipated cardiac arrest  ASA# 12 - Carlyn-op Mortality Rate: ASA12B - Patient did NOT die  ASA# 13 - PACU Re-Intubation Rate: ASA13B - Patient did NOT require a new airway mgmt  ASA# 10 - Composite Anes Safety: ASA10A - No serious adverse event    Additional Notes:

## 2021-06-22 NOTE — PROGRESS NOTES
Preoperative Exam    Scheduled Procedure: colonoscopy with rectal polyp fulguration  Surgery Date:  12/14/18  Surgery Location: [facility] [place], fax [phone]    Surgeon:  Dr Moss    Assessment/Plan:     1. Adenomatous rectal polyp  Scheduled 12/14/18    2. IHD (ischemic heart disease)  asymptomatic  - HM1(CBC and Differential)  - Lipid Cascade  - Renal Function Profile  - HM1 (CBC with Diff)    3. Essential hypertension  controlled    4. Hyperlipidemia, unspecified hyperlipidemia type  On rx    5. Thrombocytopenia (H)  No bleeding history    6. Moderate aortic stenosis by prior echocardiography  asymptomatic     Surgical Procedure Risk: Low (reported cardiac risk generally < 1%)  Have you had prior anesthesia?: Yes  Have you or any family members had a previous anesthesia reaction:  No  Do you or any family members have a history of a clotting or bleeding disorder?: No  Cardiac Risk Assessment: no increased risk for major cardiac complications    Patient approved for surgery with general or local anesthesia.      Recommendations    1. Hold asa for a week  2. Take metoprolol am of surgery        Augustin Lagunas MD  Internal medicine  [facility]  [phone]  [email or web address]    Functional Status: Independent  Patient plans to recover at home with family.         This provider spent greater than 40 min. face-to-face time with the patient and/or his family.  More than half this time was spent in counseling and or coordination of care which was consistent with the nature of this patient's problems which are listed and described in the assessment and plan.  Subjective:      Ike Cartagena is a 89 y.o. male who presents for a preoperative consultation.      Recurrent rectal polyp formation  Doing well  Heart disease asymptomatic  Has tolerated these fulgurations without problems    Mild thrombocytopenia  No bleeding history    No recent infections  Recent dental  crown  Partial dentures      All other systems reviewed and are negative, other than those listed in the HPI.    Pertinent History  Do you have difficulty breathing or chest pain after walking up a flight of stairs: No  History of obstructive sleep apnea: No  Steroid use in the last 6 months: No  Frequent Aspirin/NSAID use: Yes: Yes on daily aspirin for heart disease  Prior Blood Transfusion: No  Prior Blood Transfusion Reaction: No  If for some reason prior to, during or after the procedure, if it is medically indicated, would you be willing to have a blood transfusion?:  There is no transfusion refusal.    Current Outpatient Medications   Medication Sig Dispense Refill     ascorbic acid (VITAMIN C) 100 MG tablet Take 1,000 mg by mouth daily.        aspirin 81 MG EC tablet Take 81 mg by mouth daily.       atorvastatin (LIPITOR) 10 MG tablet Take 1 tablet (10 mg total) by mouth at bedtime. 90 tablet 3     finasteride (PROSCAR) 5 mg tablet Take 1 tablet (5 mg total) by mouth daily. 90 tablet 3     metoprolol succinate (TOPROL-XL) 50 MG 24 hr tablet Take 1 tablet (50 mg total) by mouth daily. 90 tablet 3     multivitamin therapeutic (THERAGRAN) tablet Take 1 tablet by mouth daily.       nitroglycerin (NITROSTAT) 0.4 MG SL tablet Place 1 tablet (0.4 mg total) under the tongue every 5 (five) minutes as needed for chest pain. 25 tablet 12     OMEGA-3/DHA/EPA/FISH OIL (FISH OIL-OMEGA-3 FATTY ACIDS) 300-1,000 mg capsule Take 1 g by mouth daily.        omeprazole (PRILOSEC) 20 MG capsule Take 1 capsule (20 mg total) by mouth daily. 90 capsule 3     spironolactone (ALDACTONE) 50 MG tablet Take 1 tablet (50 mg total) by mouth daily. 90 tablet 3     UBIDECARENONE/VITAMIN E MIXED (COQ10  ORAL) Take 100 mg by mouth daily.       No current facility-administered medications for this visit.         No Known Allergies    Patient Active Problem List   Diagnosis     IHD (ischemic heart disease)     HTN (hypertension)      Hyperlipemia     PIN (prostatic intraepithelial neoplasia)     Rectal tumor       Past Medical History:   Diagnosis Date     Adenomatous rectal polyp 2011    dysplastic     Elevated PSA     11 height     Gastritis     HELIObacter  POSITIVE   rx      HTN (hypertension)      Hyperlipemia     LOW HDL     IHD (ischemic heart disease)     inf mi .ptca r coronary     [place]: positive      PIN (prostatic intraepithelial neoplasia)      Pleural plaque 2018    CT-pleural plaque-     Prostatic hypertrophy     BENIGN     Rectal tumor     DYSPLASTIC POLYP     Ventricular ectopy        Past Surgical History:   Procedure Laterality Date     CATARACT EXTRACTION       colon poly       INGUINAL HERNIORRHAPHY Bilateral      PROSTATE BIOPSY  ,,,     RECTAL TUMOR BY PROCTOTOMY EXCISION       TONSILLECTOMY         Social History     Socioeconomic History     Marital status:      Spouse name: Not on file     Number of children: Not on file     Years of education: Not on file     Highest education level: Not on file   Social Needs     Financial resource strain: Not on file     Food insecurity - worry: Not on file     Food insecurity - inability: Not on file     Transportation needs - medical: Not on file     Transportation needs - non-medical: Not on file   Occupational History     Not on file   Tobacco Use     Smoking status: Former Smoker     Last attempt to quit: 1967     Years since quittin.0     Smokeless tobacco: Former User     Quit date: 1967   Substance and Sexual Activity     Alcohol use: Yes     Drug use: Not on file     Sexual activity: Not on file   Other Topics Concern     Not on file   Social History Narrative    WIFE FRANCIA    RETIRED [employer]-    [employer] [place] [employer] 1944 [employer]    NATIVE OF [place], [place]    DAVIS [facility] [place]    4 GRANDCHILDREN    SON ALLY    DAUGHTER ABDIEL ADOPTED                   Patient Care Team:  Janneth  "Aguustin HILLS MD as PCP - General (Internal Medicine)          Objective:     Vitals:    18 0902   BP: 138/60   Pulse: 66   SpO2: 99%   Weight: 142 lb 1.9 oz (64.5 kg)   Height: 5' 5.75\" (1.67 m)         Physical Exam:  Skin: Normal. No rash or lesion.  No bruising or petechiae  Head:  Normocephalic, symmetric  Speech-clear  Eyes: Eyes midline full EOM.  External exams normal.  No icterus  Both upper and lower partial dentures  Neck:  No palpable masses, lymphadenopathy or tenderness. No thyromegaly or goiter  Carotid Arteries:  Equal pulsations bilateral.No Bruit, normal upstroke  Chest Wall: No deformity or pain elicited on compression.  Respiratory:  Normal respiratory effort.  Lungs are clear with good breath sounds.  No dullness.  No wheezing.  Heart: Regular rhythm.  Normal sounding S1, S2 3/6 murmur of aortic stenosis-old unchanged   extremities-no edema excellent pulses.    Labs:  Drawn pending        Reviewed By     Augustin Lagunas MD on 10/26/2018 09:40      Electrocardiogram Perform - Clinic   Order: [id number]   Status:  Final result   Visible to patient:  No (Not Released) Next appt:  None Dx:  IHD (ischemic heart disease); Dyspnea    Ref Range & Units 10/26/18 0849 16 0845   SYSTOLIC BLOOD PRESSURE mmHg     DIASTOLIC BLOOD PRESSURE mmHg     VENTRICULAR RATE BPM 82  72    ATRIAL RATE BPM 82  72    P-R INTERVAL ms 176  178    QRS DURATION ms 102  96    Q-T INTERVAL ms 388  402    QTC CALCULATION (BEZET) ms 453  440    P Axis degrees 69  69    R AXIS degrees 74  71    T AXIS degrees 53  50    MUSE DIAGNOSIS  Normal sinus rhythm   Normal ECG   When compared with ECG of 23-DEC-2016 08:45,   No significant change was found                       Augustin Lagunas MD on 2018 16:07   Echo Complete   Order# [id number]   Reading physician: Daniele Valencia MD Ordering physician: Augustin Lagunas MD Study date: 18   Patient Information     Patient Name  Ike Cartagena MRN  [id number] Sex  Male  "             Age  02/19/1927 (89 y.o.)   Indications     Dx: IHD (ischemic heart disease) [I25.9 (ICD-10-CM)]; Dyspnea [R06.00 (ICD-10-CM)]   Summary       No previous study for comparison.    Left ventricle ejection fraction is normal. The estimated left ventricular ejection fraction is 60%.    Normal left ventricular size.    Normal right ventricular size and systolic function.    Mild to moderate aortic stenosis with trace aortic insufficiency            Immunization History   Administered Date(s) Administered     Influenza high dose, seasonal 09/23/2016, 10/06/2017, 10/26/2018     Pneumo Conj 13-V (2010&after) 01/15/2015     Pneumo Polysac 23-V 11/10/2004, 10/13/2009     Td,adult,historic,unspecified 08/11/2000     Tdap 08/26/2011           Electronically signed by Augustin Lagunas MD 12/07/18 9:03 AM

## 2021-06-22 NOTE — ANESTHESIA CARE TRANSFER NOTE
Last vitals:   Vitals:    01/03/19 0921   BP: 119/58   Pulse: 77   Resp: 18   Temp: 36.4  C (97.6  F)   SpO2: 98%     Pt brought to phase 2 on 6L facemask. Monitors applied. VSS upon arrival.    Patient's level of consciousness is drowsy  Spontaneous respirations: yes  Maintains airway independently: yes  Dentition unchanged: yes  Oropharynx: oropharynx clear of all foreign objects    QCDR Measures:  ASA# 20 - Surgical Safety Checklist: WHO surgical safety checklist completed prior to induction    PQRS# 430 - Adult PONV Prevention: 4558F - Pt received => 2 anti-emetic agents (different classes) preop & intraop  ASA# 8 - Peds PONV Prevention: NA - Not pediatric patient, not GA or 2 or more risk factors NOT present  PQRS# 424 - Carlyn-op Temp Management: NA - MAC anesthesia or case < 60 minutes  PQRS# 426 - PACU Transfer Protocol: - Transfer of care checklist used  ASA# 14 - Acute Post-op Pain: ASA14B - Patient did NOT experience pain >= 7 out of 10

## 2021-06-22 NOTE — ANESTHESIA PREPROCEDURE EVALUATION
Anesthesia Evaluation      Patient summary reviewed   No history of anesthetic complications     Airway   Mallampati: II   Pulmonary - negative ROS and normal exam                          Cardiovascular   Exercise tolerance: > or = 4 METS  (+) hypertension, valvular problems/murmurs AS, , hypercholesterolemia,     ECG reviewed (NSR)  Rhythm: regular  Rate: normal,      ROS comment: 11/18 TTE  No previous study for comparison.  Left ventricle ejection fraction is normal. The estimated left ventricular ejection fraction is 60%.  Normal left ventricular size.  Normal right ventricular size and systolic function.  Mild to moderate aortic stenosis with trace aortic insufficiency     Neuro/Psych    (+) neuromuscular disease,      Endo/Other - negative ROS      GI/Hepatic/Renal    (+) hiatal hernia, GERD,        Other findings: Results for GIOVANNI TAYLOR ONESIMO (MRN [id number]) as of 1/3/2019 08:20    12/7/2018 09:46  Sodium: 141  Potassium: 4.1  Chloride: 106  CO2: 25  Anion Gap, Calculation: 10  BUN: 30 (H)  Creatinine: 1.01  GFR MDRD Af Amer: >60  GFR MDRD Non Af Amer: >60  Results for GIOVANNI TAYLOR (MRN [id number]) as of 1/3/2019 08:20    12/7/2018 09:46  Glucose: 105  WBC: 6.0  RBC: 4.49  Hemoglobin: 13.9 (L)  Hematocrit: 41.3  MCV: 92  MCH: 31.1  MCHC: 33.7  RDW: 13.0  Platelets: 138 (L)   IHD (ischemic heart disease)    HTN (hypertension)    Hyperlipemia    PIN (prostatic intraepithelial neoplasia)    Rectal tumor            Dental    (+) upper dentures and lower dentures                       Anesthesia Plan  Planned anesthetic: MAC  Versed/fent  propofol ggt  Decadron/zofran  ASA 3     Anesthetic plan and risks discussed with: patient    Post-op plan: routine recovery

## 2021-06-24 NOTE — TELEPHONE ENCOUNTER
RN cannot approve Refill Request    RN can NOT refill this medication med is not covered by policy/route to provider     . Last office visit: 12/7/2018 Augustin Lagunas MD Last Physical: 12/23/2016 Last MTM visit: Visit date not found Last visit same specialty: 12/7/2018 Augustin Lagunas MD.  Next visit within 3 mo: Visit date not found  Next physical within 3 mo: Visit date not found      Dana Walsh, Care Connection Triage/Med Refill 2/22/2019    Requested Prescriptions   Pending Prescriptions Disp Refills     finasteride (PROSCAR) 5 mg tablet [Pharmacy Med Name: FINASTERIDE TABS 5MG 5MG] 90 tablet 3     Sig: TAKE 1 TABLET DAILY    There is no refill protocol information for this order

## 2021-06-30 NOTE — PROGRESS NOTES
"Progress Notes by Kasia Samaniego RN at 2/1/2021  1:00 PM     Author: Kasia Samaniego RN Service: -- Author Type: Registered Nurse    Filed: 2/3/2021 12:15 PM Encounter Date: 2/1/2021 Status: Signed    : Kasia Samaniego RN (Registered Nurse)       Clinic Care Coordination Contact    Clinic Care Coordination Contact  OUTREACH    Referral Information:  Referral Source: Other, specify(BPCI-A)  Hospital discharge date: 11/23/20    Primary Diagnosis: SIRS/Sepsis    Background:  Patient was hospitalized at [facility] from 11/18/20 to 11/23/20 with septic shock, sepsis due to E. Coli UTI.  He was discharged home to complete a 10 day course of Cefdinir and to follow up with his PCP in one week.    BPCI-A assessment completed with patient.    Pt states he feels \"good, getting better every week.\"    Independent at baseline, ambulates without a device.  Drives himself to/from appts, and or daughter assists with transportation needs if pt unable to drive self.    He does PT exercises at home on his own 3-4 days a week, and in between he does a lot of walking around his home or outside, weather permitting.    He had a virtual follow up visit with Dr. Miller on 12/8/20 - BP very well controlled, goal systolic < 130-140, and directed pt to check his BP 3 days a week and call clinic with readings in one week.  RN CC asked pt about his BP readings, pt states he hasn't been checking his BP \"because I haven't felt bad.\"  He confirms he has a BP cuff at home and agrees to do check his BP once a week and keep a log of his readings.  If his BP is higher, he will check more frequently and follow up with clinic.     States he still has some trouble with constipation and stool incontinence about once a week; \"I have to watch it, make sure I drink a lot water.\"  He will continue to monitor sx.        Chief Complaint   Patient presents with   ? Clinic Care Coordination - Initial     BPCI-A        Universal Utilization: " "  Clinic Utilization  Difficulty keeping appointments:: No  Compliance Concerns: No  No-Show Concerns: No  No PCP office visit in Past Year: Yes  Utilization    Last refreshed: 2021  1:45 AM: Hospital Admissions 1           Last refreshed: 2021  1:45 AM: ED Visits 1           Last refreshed: 2021  3:38 PM: No Show Count (past year) 0              Current as of: 2021  3:38 PM              Clinical Concerns:  Current Medical Concerns:    Patient Active Problem List   Diagnosis   ? IHD (ischemic heart disease)   ? HTN (hypertension)   ? Hyperlipemia   ? PIN (prostatic intraepithelial neoplasia)   ? Rectal tumor   ? Moderate aortic stenosis by prior echocardiography   ? Thrombocytopenia (H)   ? Sepsis due to urinary tract infection (H)   ? Septic shock (H)   ? Acute respiratory failure with hypoxemia (H)   ? Acute kidney injury (BRYNN) with acute tubular necrosis (ATN) (H)   ? Encephalopathy   ? E. coli UTI          Current Behavioral Concerns:  Pt denies any mental health concerns at this time.  Reports his wife recently passed away,  in her sleep about a month ago.  Feels he's \"doing ok, I miss her but I'm ok.\"  States he has a great support system and feels well supported.  Apologized to patient for the loss of wife, and informed pt he if feels he needs additional support, there are grief support groups.  Again, pt declined need for additional resources at this time stating he's doing \"ok.\"      Education Provided to patient: BPCI-A Care Coordination    Pain  Pain (GOAL):: No  Health Maintenance Reviewed: Up to date - Reminded pt to schedule a follow up appt to establish care with a new PCP, as his previous PCP (Dr. Lagunas) retired.     Medication Management:  Pt manages his medications independently and has been taking as prescribed.    Functional Status:  Dependent ADLs:: Independent, Ambulation-no assistive device  Dependent IADLs:: Independent  Bed or wheelchair confined:: No  Mobility Status: " Independent  Fallen 2 or more times in the past year?: No  Any fall with injury in the past year?: No    Living Situation:  Current living arrangement:: I live alone  Type of residence:: Private home - stairs    Lifestyle & Psychosocial Needs:  Lifestyle   ? Physical activity     Days per week: 3 days     Minutes per session: Not on file   ? Stress: Not at all     Social Needs   ? Financial resource strain: Not hard at all   ? Food insecurity     Worry: Never true     Inability: Never true   ? Transportation needs     Medical: No     Non-medical: No     Diet:: Regular  Inadequate nutrition (GOAL):: No  Tube Feeding: No  Inadequate activity/exercise (GOAL):: No  Significant changes in sleep pattern (GOAL): No  Transportation means:: Family, Regular car     Alevism or spiritual beliefs that impact treatment:: No  Mental health DX:: No  Mental health management concern (GOAL):: No  Chemical Dependency Status: Not Applicable  Chemical Dependency Management: (N/A)  Informal Support system:: Children, Neighbors   Socioeconomic History   ? Marital status:      Spouse name: Not on file   ? Number of children: Not on file   ? Years of education: Not on file   ? Highest education level: Not on file   Relationships   ? Social connections     Talks on phone: Not on file     Gets together: Not on file     Attends Oriental orthodox service: Not on file     Active member of club or organization: Not on file     Attends meetings of clubs or organizations: Not on file     Relationship status:    ? Intimate partner violence     Fear of current or ex partner: No     Emotionally abused: No     Physically abused: No     Forced sexual activity: No     Tobacco Use   ? Smoking status: Former Smoker     Quit date: 1967     Years since quittin.2   ? Smokeless tobacco: Never Used   Substance and Sexual Activity   ? Alcohol use: Yes     Alcohol/week: 7.0 standard drinks     Types: 7 Standard drinks or equivalent per week      Comment: has one drink of scotch a day   ? Drug use: No         Resources and Interventions:  Current Resources:      Community Resources: None  Supplies Currently Used at Home: None  Equipment Currently Used at Home: none  Type of Employment: [employer]  VA Benefits? : No    Advance Care Plan/Directive  Advanced Care Plans/Directives on file:: No  Advanced Care Plan/Directive Status: Declined Further Information    Referrals Placed: None     Goals:   Goals        General    Medical (pt-stated)     Notes - Note edited  2/3/2021 11:21 AM by Kasia Samaniego RN    Goal Statement:  I want to stay out of the hospital for the next 90 days.  Date Goal set:  2/1/2021  Barriers:  Recent hospitalization for sepsis  Strengths:  Able to identify and advocate for needs, supportive family/neighbors, motivated  Date to Achieve By:  2/23/21 (based on hospital discharge date of 11/23/20)  Patient expressed understanding of goal:  Yes  Action steps to achieve this goal:  1. I will take my medications as prescribed  2. I will attend all my appts as scheduled, and recommended follow up appts with my PCP.  3. I will start checking my blood pressure (BP) once a week and keep a record of my readings.  If I notice higher readings, or don't feel well, I will check my BP more often.  4. I will call my clinic if I start to feel sick or notice any change(s) in my health, and schedule an appt if needed  5. I will call the triage nurse line for advice, before going to the hospital  6. I will go to  or Walk-In-Clinic before going to the hospital, if I am unable to get an appt with my PCP  7. I will update the Care Coordination team during outreach calls and ask for additional support or resources, if needed              Patient/Caregiver understanding: Patient stated an understanding of 90 day BPCI-A enrollment from hospital discharge date of 11/23/20       Outreach Frequency: monthly  Future Appointments              In 3 weeks Danette  ANA Pedroza [facility]          Plan:     Per BPCI-A guidelines, Care Coordination (CC) team will monitor patient for 90 days post hospitalization, through 2/23/21    CHW to follow up with patient per BPCI-A guidelines    RN Care Coordinator will follow up with patient again in one month, per BPCI-A guidelines, for final outreach/graduation.      Kasia Samaniego RN Clinic Care Coordinator

## 2021-07-16 ENCOUNTER — TELEPHONE (OUTPATIENT)
Dept: INTERNAL MEDICINE | Facility: CLINIC | Age: 86
End: 2021-07-16

## 2021-07-16 NOTE — TELEPHONE ENCOUNTER
Reason for Call: Request for an order or referral:    Order or referral being requested:  Hospital Bed and any home care or suggestions Dr. Ventura has to suggest for the family to care for patient.    She does not have a preferred [employer] company for the hospital bed    She has requested FMLA paperwork from her employer does not have the form yet    Date needed: as soon as possible    Has the patient been seen by the PCP for this problem? YES    Additional comments: Helena reports that her father is rapidly declining and wants to die at home.    Patient has sore on his bottom she and her Aunt are using Cortisone cream but wonder if Dr. Prabhakar would recommend anything else    Patient does not want people in his home, but Helena would like to know what assistance would be available to help her and her Aunt care for patient.    Phone number Patient can be reached at:  Cell number on file:    Telephone Information:   Mobile [phone]       Best Time:  Any    Can we leave a detailed message on this number?  YES    Call taken on 7/16/2021 at 1:18 PM by Dana John

## 2021-07-23 ENCOUNTER — TELEPHONE (OUTPATIENT)
Dept: INTERNAL MEDICINE | Facility: CLINIC | Age: 86
End: 2021-07-23

## 2021-07-23 NOTE — TELEPHONE ENCOUNTER
Andrés with [facility] Examiners Office calling.    Patient has passed away, they believe the death is due to natural causes need to know if Dr. Ventura is willing to sign death certificate.    Please return call to

## 2021-07-27 NOTE — TELEPHONE ENCOUNTER
Called daughter Helena regarding paperwork, discussed with her over the phone the hours she was caring for her bedridden father during the dying process from 21- 21 (when patient )    Forms completed and placed on Dr. Ventura desk at the [facility] for signature when he returns     Patient daughter advised we will call when forms complete and placed at the  [facility] for pickup on Thursday    Patient daughter expressed understanding

## 2021-07-27 NOTE — TELEPHONE ENCOUNTER
Dr. Audrey Malik patient's daughter is calling and she is wondering if you have filled out her [facility] paperwork.  She would like a call back at .

## 2021-07-27 NOTE — TELEPHONE ENCOUNTER
I have not filled out the paperwork.    Yesterday the patient's father was on my schedule for me to complete the paperwork for her.  The [place]    The patient himself had  last Thursday, 2021.  I did not feel that it was appropriate to complete this paperwork in light of her father's death.    She may have to make a virtual visit herself to complete the paperwork for herself regarding her personal care of her late father.    Hope fully this is clear and understood.

## 2021-07-27 NOTE — TELEPHONE ENCOUNTER
Dr. Ventura    Patient' daughter Helena wants to know if you will fill out her paperwork now if she made a telephone visit with you?  She doesn't have a primary Dr herself that can fill this out.  She had to take some time off of work to take care of him.   She would like a call back at
